# Patient Record
Sex: MALE | Race: WHITE | NOT HISPANIC OR LATINO | Employment: FULL TIME | URBAN - METROPOLITAN AREA
[De-identification: names, ages, dates, MRNs, and addresses within clinical notes are randomized per-mention and may not be internally consistent; named-entity substitution may affect disease eponyms.]

---

## 2018-11-28 ENCOUNTER — APPOINTMENT (OUTPATIENT)
Dept: RADIOLOGY | Facility: CLINIC | Age: 33
End: 2018-11-28
Payer: COMMERCIAL

## 2018-11-28 VITALS
WEIGHT: 236 LBS | HEART RATE: 80 BPM | BODY MASS INDEX: 31.28 KG/M2 | DIASTOLIC BLOOD PRESSURE: 85 MMHG | HEIGHT: 73 IN | SYSTOLIC BLOOD PRESSURE: 137 MMHG

## 2018-11-28 DIAGNOSIS — M77.11 LATERAL EPICONDYLITIS OF RIGHT ELBOW: Primary | ICD-10-CM

## 2018-11-28 DIAGNOSIS — M77.01 MEDIAL EPICONDYLITIS OF RIGHT ELBOW: ICD-10-CM

## 2018-11-28 DIAGNOSIS — M25.521 PAIN IN RIGHT ELBOW: ICD-10-CM

## 2018-11-28 DIAGNOSIS — G56.21 NEURITIS OF RIGHT ULNAR NERVE: ICD-10-CM

## 2018-11-28 PROCEDURE — 99243 OFF/OP CNSLTJ NEW/EST LOW 30: CPT | Performed by: SURGERY

## 2018-11-28 PROCEDURE — 73080 X-RAY EXAM OF ELBOW: CPT

## 2018-11-28 RX ORDER — FEXOFENADINE HCL 180 MG/1
180 TABLET ORAL DAILY
COMMUNITY

## 2018-11-28 NOTE — PROGRESS NOTES
Gunjan LESTER  Attending, Orthopaedic Surgery  Hand, Wrist, and Elbow Surgery  [facility]      ORTHOPAEDIC HAND, WRIST, AND ELBOW OFFICE  VISIT       ASSESSMENT/PLAN:      Right elbow medial epicondylitis, lateral epicondylitis, ulnar nerve neuritis    Diagnostics reviewed and physical exam performed  Diagnosis, treatment options and associated risks were discussed with the patient including no treatment, nonsurgical treatment and potential for surgical intervention  The patient was given the opportunity to ask questions regarding each  Recommend a course of formal therapy to include ulnar nerve gliding as well as use of flexi-bar  Educated on the use of over-the-counter Aleve 2 pills twice a day routinely for the next 10 days and then as indicated  Avoid repetitive work or aggravating activities  The patient verbalized understanding of exam findings and treatment plan  We engaged in the shared decision-making process and treatment options were discussed at length with the patient  Surgical and conservative management discussed today along with risks and benefits  Diagnoses and all orders for this visit:    Lateral epicondylitis of right elbow  -     XR elbow 3+ vw right; Future  -     Ambulatory referral to PT/OT hand therapy; Future    Medial epicondylitis of right elbow  -     Ambulatory referral to PT/OT hand therapy; Future    Neuritis of right ulnar nerve  -     Ambulatory referral to PT/OT hand therapy; Future    Other orders  -     fexofenadine (ALLEGRA) 180 MG tablet; Take 180 mg by mouth daily        Follow Up:  Return for re-check in 6-8 weeks   To Do Next Visit:  Re-evaluation of current issue      General Discussions:    Cubital Tunnel Syndrome: The anatomy and physiology of cubital tunnel syndrome were discussed with the patient today in the office    Typically, increased elbow flexion activities decrease blood flow within the intraneural spaces, resulting in a feeling of numbness, tingling, weakness, or clumsiness within the hand and fingers  Occasionally, anatomic structures such as medial elbow osteophytes, the medial head of the triceps, were subluxing ulnar nerve may result in increased pressure or aggravation at the cubital tunnel  Typical signs and symptoms usually include numbness and tingling within the ring and small finger, weakness with , and weakness with pinch  Conservative treatment and includes nocturnal bracing to keep the elbow in a semi-extended position, activity modification, therapy, and avoiding excessive elbow flexion activities  Vitamin B6 one tablet daily over the counter may helpful to reduce symptoms  A majority of patients typically respond to conservative treatment over a period of approximately 3-6 months  EMG/NCV testing of the ulnar nerve at the elbow is not as reliable as carpal tunnel syndrome  Surgical intervention in the form of in situ release of the ulnar nerve at the elbow or ulnar nerve transposition may be required in up to 20% of patients  Lateral Epicondylitis: The anatomy and physiology of lateral epicondylitis was discussed with the patient today  Typically, degenerative changes in the extensor carpi radialis brevis muscle occur over time  These degenerative changes appear as tears of the tendon on MRI  This creates pain over the lateral epicondyle (outside part of elbow)  This pain typically is made worse with palm down lifting activities as well as anything that involves strength and stability of the wrist   The pain may radiate from the wrist up to the elbow  At times, the shoulder may be weak as well which can predispose or cause continuation of the problem  Conservative treatment usually cures a majority of patients; however, this may take up to 6-9 months    Conservative treatment options typically include activity modification, therapy for strengthening of the shoulder and elbow, tennis elbow straps, and possible corticosteroid injections  Corticosteroid injections are very effective at relieving pain but do not alter the natural history of this process  Rather, steroid injections decrease the pain temporarily to allow for therapy to take place without discomfort  It was discussed that therapy to prevent recurrence is a "life long" process and that if patient relies on the steroid injection alone without performing therapeutic exercises the risk of recurrence is likely  Typical home regimen includes heat and stretching and resisted wrist extension exercises discussed in the office  Surgery is required in fewer than 10% of patients  Medial Epicondylitis: The anatomy and physiology of medial epicondylitis was discussed with the patient today  Typically, degenerative changes in the flexor pronator mass occur over time  These degenerative changes appear as tears of the tendon on MRI  This creates pain over the medial epicondyle  This pain typically is made worse with palm up lifting activities as well as anything that involves strength and stability of the wrist   The pain may radiate from the wrist up to the elbow  At times, the shoulder may be weak as well which can predispose or cause continuation of the problem  Conservative treatment usually cures a vast majority of patients; however, this may take up to 6-9 months  Conservative treatment options typically include activity modification, therapy for strengthening of the shoulder and elbow, tennis elbow strap, and possible corticosteroid injections  Corticosteroid injections are very effective at relieving pain but do not alter the natural history of this process  Rather, steroid injections decrease the pain temporarily to allow for therapy to take place without discomfort   It was discussed that therapy to prevent recurrence is a "life long" process and that if patient relies on the steroid injection alone without performing therapeutic exercises the risk of recurrence is likely  Typical home regimen includes heat,  stretching and resisted wrist flex ion and forearm rotation exercises discussed in the office  Surgery is required in fewer than 5% of patients  At times, the ulnar nerve may be aggravated with this condition  Operative Discussions:  None indicated      ____________________________________________________________________________________________________________________________________________      CHIEF COMPLAINT:  Chief Complaint   Patient presents with    Right Elbow - Pain    Right Forearm - Pain       SUBJECTIVE:  Tal Tate is a 35y o  year old RHD male who presents initial evaluation of right elbow and forearm discomfort  His symptoms are greater medial than lateral   He states that his symptoms started in August without any precipitating injury  He denies any numbness or tingling  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  He has an occasional golfer which does not aggravate his symptoms  He was routinely visiting a fitness facility up until July without any issues however was doing heavy weights at the time  He has not had any formal treatment  Of note several years ago he had a right shoulder labral pathology in which he was in formal physical therapy for  Pain/symptom timing:  Worse during the day when active  Pain/symptom context:  Worse with activites and work  Pain/symptom modifying factors:  Rest makes better, activities make worse  Pain/symptom associated signs/symptoms: none    Prior treatment   · NSAIDsNo   · Injections No   · Bracing/Orthotics No    Physical Therapy No     I have personally reviewed all the relevant PMH, PSH, SH, FH, Medications and allergies      PAST MEDICAL HISTORY:  History reviewed  No pertinent past medical history  PAST SURGICAL HISTORY:  History reviewed  No pertinent surgical history      FAMILY HISTORY:  History reviewed  No pertinent family history  SOCIAL HISTORY:  Social History   Substance Use Topics    Smoking status: Never Smoker    Smokeless tobacco: Never Used    Alcohol use No       MEDICATIONS:    Current Outpatient Prescriptions:     fexofenadine (ALLEGRA) 180 MG tablet, Take 180 mg by mouth daily, Disp: , Rfl:     ALLERGIES:  No Known Allergies        REVIEW OF SYSTEMS:  Review of systems negative unless otherwise specified in HPI  Review of Systems   Constitutional: Negative for activity change, chills, fever and unexpected weight change  HENT: Negative for trouble swallowing and voice change  Eyes: Negative for pain and visual disturbance  Respiratory: Negative for shortness of breath and wheezing  Cardiovascular: Negative for chest pain, palpitations and leg swelling  Gastrointestinal: Negative for abdominal pain, nausea and vomiting  Endocrine: Negative for polydipsia and polyuria  Genitourinary: Negative for dysuria and hematuria  Musculoskeletal: Positive for arthralgias  Negative for joint swelling and myalgias  Skin: Negative for rash and wound  Neurological: Negative for dizziness, numbness and headaches  Psychiatric/Behavioral: Negative for decreased concentration and suicidal ideas  The patient is not nervous/anxious          VITALS:  Vitals:    11/28/18 0803   BP: 137/85   Pulse: 80       LABS:  HgA1c: No results found for: HGBA1C  BMP: No results found for: GLUCOSE, CALCIUM, NA, K, CO2, CL, BUN, CREATININE    _____________________________________________________  PHYSICAL EXAMINATION:  General: well developed and well nourished, alert, oriented times 3 and appears comfortable  Psychiatric: Normal  HEENT: Normocephalic, Atraumatic Trachea Midline, No torticollis  Pulmonary: No audible wheezing or respiratory distress   Cardiovascular: No pitting edema, 2+ radial pulse   Skin: No masses, erthema, lacerations, fluctation, ulcerations  Neurovascular: Sensation Intact to the Median, Ulnar, Radial Nerve, Motor Intact to the Median, Ulnar, Radial Nerve and Pulses Intact  Musculoskeletal: Normal, except as noted in detailed exam and in HPI  MUSCULOSKELETAL EXAMINATION:    Right Elbow:  There is no swelling present  There is no ecchymosis noted  The ROM is full in all planes of motion, no ulnar nerve subluxation  + tinels at the elbow, slightly + ulnar nerve compression  Increased forearm pain with resistance to wrist extension, not with resistance to forearm supination or pronation or wrist flexion  Slight tenderness radial tunnel  Tenderness over medial and lateral epicondyles  There is no instability with varus or valgus stress         ___________________________________________________  STUDIES REVIEWED:  The attending physician has personally reviewed the pertinent films in PACS and interpretation is as follows:   AP, lateral, and oblique radiographs   Right elbow x-rays taken and reviewed in the office today show:  No fracture dislocation otherwise unremarkable        PROCEDURES PERFORMED:  Procedures  No Procedures performed today    _____________________________________________________      Catrina Ryan    I,:   Jodie Longoria am acting as a scribe while in the presence of the attending physician :        I,:   Austin Pillai MD personally performed the services described in this documentation    as scribed in my presence :

## 2018-11-28 NOTE — LETTER
November 28, 2018     MICAH Phillip DO  [address] [place] [address]    Patient: Karen Edmonds   YOB: 1985   Date of Visit: 11/28/2018       Dear Dr Shirley Chaney: Thank you for referring Karen Edmonds to me for evaluation  Below are my notes for this consultation  If you have questions, please do not hesitate to call me  I look forward to following your patient along with you  Sincerely,        Jaylen Patrick MD        CC: No Recipients    Jaylen LESTER  Attending, Orthopaedic Surgery  Hand, Wrist, and Elbow Surgery  [facility]      ORTHOPAEDIC HAND, WRIST, AND ELBOW OFFICE  VISIT       ASSESSMENT/PLAN:      Right elbow medial epicondylitis, lateral epicondylitis, ulnar nerve neuritis    Diagnostics reviewed and physical exam performed  Diagnosis, treatment options and associated risks were discussed with the patient including no treatment, nonsurgical treatment and potential for surgical intervention  The patient was given the opportunity to ask questions regarding each  Recommend a course of formal therapy to include ulnar nerve gliding as well as use of flexi-bar  Educated on the use of over-the-counter Aleve 2 pills twice a day routinely for the next 10 days and then as indicated  Avoid repetitive work or aggravating activities  The patient verbalized understanding of exam findings and treatment plan  We engaged in the shared decision-making process and treatment options were discussed at length with the patient  Surgical and conservative management discussed today along with risks and benefits  Diagnoses and all orders for this visit:    Lateral epicondylitis of right elbow  -     XR elbow 3+ vw right; Future  -     Ambulatory referral to PT/OT hand therapy; Future    Medial epicondylitis of right elbow  -     Ambulatory referral to PT/OT hand therapy;  Future    Neuritis of right ulnar nerve  -     Ambulatory referral to PT/OT hand therapy; Future    Other orders  -     fexofenadine (ALLEGRA) 180 MG tablet; Take 180 mg by mouth daily        Follow Up:  Return for re-check in 6-8 weeks   To Do Next Visit:  Re-evaluation of current issue      General Discussions:    Cubital Tunnel Syndrome: The anatomy and physiology of cubital tunnel syndrome were discussed with the patient today in the office  Typically, increased elbow flexion activities decrease blood flow within the intraneural spaces, resulting in a feeling of numbness, tingling, weakness, or clumsiness within the hand and fingers  Occasionally, anatomic structures such as medial elbow osteophytes, the medial head of the triceps, were subluxing ulnar nerve may result in increased pressure or aggravation at the cubital tunnel  Typical signs and symptoms usually include numbness and tingling within the ring and small finger, weakness with , and weakness with pinch  Conservative treatment and includes nocturnal bracing to keep the elbow in a semi-extended position, activity modification, therapy, and avoiding excessive elbow flexion activities  Vitamin B6 one tablet daily over the counter may helpful to reduce symptoms  A majority of patients typically respond to conservative treatment over a period of approximately 3-6 months  EMG/NCV testing of the ulnar nerve at the elbow is not as reliable as carpal tunnel syndrome  Surgical intervention in the form of in situ release of the ulnar nerve at the elbow or ulnar nerve transposition may be required in up to 20% of patients  Lateral Epicondylitis: The anatomy and physiology of lateral epicondylitis was discussed with the patient today  Typically, degenerative changes in the extensor carpi radialis brevis muscle occur over time  These degenerative changes appear as tears of the tendon on MRI  This creates pain over the lateral epicondyle (outside part of elbow)    This pain typically is made worse with palm down lifting activities as well as anything that involves strength and stability of the wrist   The pain may radiate from the wrist up to the elbow  At times, the shoulder may be weak as well which can predispose or cause continuation of the problem  Conservative treatment usually cures a majority of patients; however, this may take up to 6-9 months  Conservative treatment options typically include activity modification, therapy for strengthening of the shoulder and elbow, tennis elbow straps, and possible corticosteroid injections  Corticosteroid injections are very effective at relieving pain but do not alter the natural history of this process  Rather, steroid injections decrease the pain temporarily to allow for therapy to take place without discomfort  It was discussed that therapy to prevent recurrence is a "life long" process and that if patient relies on the steroid injection alone without performing therapeutic exercises the risk of recurrence is likely  Typical home regimen includes heat and stretching and resisted wrist extension exercises discussed in the office  Surgery is required in fewer than 10% of patients  Medial Epicondylitis: The anatomy and physiology of medial epicondylitis was discussed with the patient today  Typically, degenerative changes in the flexor pronator mass occur over time  These degenerative changes appear as tears of the tendon on MRI  This creates pain over the medial epicondyle  This pain typically is made worse with palm up lifting activities as well as anything that involves strength and stability of the wrist   The pain may radiate from the wrist up to the elbow  At times, the shoulder may be weak as well which can predispose or cause continuation of the problem  Conservative treatment usually cures a vast majority of patients; however, this may take up to 6-9 months    Conservative treatment options typically include activity modification, therapy for strengthening of the shoulder and elbow, tennis elbow strap, and possible corticosteroid injections  Corticosteroid injections are very effective at relieving pain but do not alter the natural history of this process  Rather, steroid injections decrease the pain temporarily to allow for therapy to take place without discomfort  It was discussed that therapy to prevent recurrence is a "life long" process and that if patient relies on the steroid injection alone without performing therapeutic exercises the risk of recurrence is likely  Typical home regimen includes heat,  stretching and resisted wrist flex ion and forearm rotation exercises discussed in the office  Surgery is required in fewer than 5% of patients  At times, the ulnar nerve may be aggravated with this condition  Operative Discussions:  None indicated      ____________________________________________________________________________________________________________________________________________      CHIEF COMPLAINT:  Chief Complaint   Patient presents with    Right Elbow - Pain    Right Forearm - Pain       SUBJECTIVE:  Bubba Golden is a 35y o  year old RHD male who presents initial evaluation of right elbow and forearm discomfort  His symptoms are greater medial than lateral   He states that his symptoms started in August without any precipitating injury  He denies any numbness or tingling  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  He has an occasional golfer which does not aggravate his symptoms  He was routinely visiting a fitness facility up until July without any issues however was doing heavy weights at the time  He has not had any formal treatment  Of note several years ago he had a right shoulder labral pathology in which he was in formal physical therapy for        Pain/symptom timing:  Worse during the day when active  Pain/symptom context:  Worse with activites and work  Pain/symptom modifying factors:  Rest makes better, activities make worse  Pain/symptom associated signs/symptoms: none    Prior treatment   · NSAIDsNo   · Injections No   · Bracing/Orthotics No    Physical Therapy No     I have personally reviewed all the relevant PMH, PSH, SH, FH, Medications and allergies      PAST MEDICAL HISTORY:  History reviewed  No pertinent past medical history  PAST SURGICAL HISTORY:  History reviewed  No pertinent surgical history  FAMILY HISTORY:  History reviewed  No pertinent family history  SOCIAL HISTORY:  Social History   Substance Use Topics    Smoking status: Never Smoker    Smokeless tobacco: Never Used    Alcohol use No       MEDICATIONS:    Current Outpatient Prescriptions:     fexofenadine (ALLEGRA) 180 MG tablet, Take 180 mg by mouth daily, Disp: , Rfl:     ALLERGIES:  No Known Allergies        REVIEW OF SYSTEMS:  Review of systems negative unless otherwise specified in HPI  Review of Systems   Constitutional: Negative for activity change, chills, fever and unexpected weight change  HENT: Negative for trouble swallowing and voice change  Eyes: Negative for pain and visual disturbance  Respiratory: Negative for shortness of breath and wheezing  Cardiovascular: Negative for chest pain, palpitations and leg swelling  Gastrointestinal: Negative for abdominal pain, nausea and vomiting  Endocrine: Negative for polydipsia and polyuria  Genitourinary: Negative for dysuria and hematuria  Musculoskeletal: Positive for arthralgias  Negative for joint swelling and myalgias  Skin: Negative for rash and wound  Neurological: Negative for dizziness, numbness and headaches  Psychiatric/Behavioral: Negative for decreased concentration and suicidal ideas  The patient is not nervous/anxious          VITALS:  Vitals:    11/28/18 0803   BP: 137/85   Pulse: 80       LABS:  HgA1c: No results found for: HGBA1C  BMP: No results found for: GLUCOSE, CALCIUM, NA, K, CO2, CL, BUN, CREATININE    _____________________________________________________  PHYSICAL EXAMINATION:  General: well developed and well nourished, alert, oriented times 3 and appears comfortable  Psychiatric: Normal  HEENT: Normocephalic, Atraumatic Trachea Midline, No torticollis  Pulmonary: No audible wheezing or respiratory distress   Cardiovascular: No pitting edema, 2+ radial pulse   Skin: No masses, erthema, lacerations, fluctation, ulcerations  Neurovascular: Sensation Intact to the Median, Ulnar, Radial Nerve, Motor Intact to the Median, Ulnar, Radial Nerve and Pulses Intact  Musculoskeletal: Normal, except as noted in detailed exam and in HPI  MUSCULOSKELETAL EXAMINATION:    Right Elbow:  There is no swelling present  There is no ecchymosis noted  The ROM is full in all planes of motion, no ulnar nerve subluxation  + tinels at the elbow, slightly + ulnar nerve compression  Increased forearm pain with resistance to wrist extension, not with resistance to forearm supination or pronation or wrist flexion  Slight tenderness radial tunnel  Tenderness over medial and lateral epicondyles  There is no instability with varus or valgus stress         ___________________________________________________  STUDIES REVIEWED:  The attending physician has personally reviewed the pertinent films in PACS and interpretation is as follows:   AP, lateral, and oblique radiographs   Right elbow x-rays taken and reviewed in the office today show:  No fracture dislocation otherwise unremarkable        PROCEDURES PERFORMED:  Procedures  No Procedures performed today    _____________________________________________________      Indu Nick    I,:   Luba Crjai am acting as a scribe while in the presence of the attending physician :        I,:   Daiana Banks MD personally performed the services described in this documentation    as scribed in my presence : Attestation signed by Mara York MD at 11/28/2018  9:43 AM:  I saw and examined the patient personally and agree with my physician extender's note and plan   I formulated  the plan and gave  explicit instructions to the patient

## 2018-12-17 ENCOUNTER — EVALUATION (OUTPATIENT)
Dept: OCCUPATIONAL THERAPY | Facility: CLINIC | Age: 33
End: 2018-12-17
Payer: COMMERCIAL

## 2018-12-17 DIAGNOSIS — M77.01 MEDIAL EPICONDYLITIS OF RIGHT ELBOW: ICD-10-CM

## 2018-12-17 DIAGNOSIS — M77.11 LATERAL EPICONDYLITIS OF RIGHT ELBOW: ICD-10-CM

## 2018-12-17 DIAGNOSIS — G56.21 NEURITIS OF RIGHT ULNAR NERVE: ICD-10-CM

## 2018-12-17 PROCEDURE — 97165 OT EVAL LOW COMPLEX 30 MIN: CPT | Performed by: OCCUPATIONAL THERAPIST

## 2018-12-17 PROCEDURE — G8991 OTHER PT/OT GOAL STATUS: HCPCS | Performed by: OCCUPATIONAL THERAPIST

## 2018-12-17 PROCEDURE — 97140 MANUAL THERAPY 1/> REGIONS: CPT | Performed by: OCCUPATIONAL THERAPIST

## 2018-12-17 PROCEDURE — G8990 OTHER PT/OT CURRENT STATUS: HCPCS | Performed by: OCCUPATIONAL THERAPIST

## 2018-12-17 NOTE — PROGRESS NOTES
OT Evaluation     Today's date: 2018  Patient name: Erica Lee  : 1985  MRN: [id number]  Referring provider: Jessica Lopez MD  Dx:   Encounter Diagnosis     ICD-10-CM    1  Lateral epicondylitis of right elbow M77 11 Ambulatory referral to PT/OT hand therapy   2  Medial epicondylitis of right elbow M77 01 Ambulatory referral to PT/OT hand therapy   3  Neuritis of right ulnar nerve G56 21 Ambulatory referral to PT/OT hand therapy                  Assessment  Assessment details: Referred with the formal diagnosis of R medial epicondylitis, R cubital tunnel syndrome, and minor R lateral epicondylitis  He is relying on a counter force strap and this has improved his symptoms greatly  His medial epicondyle tenderness persists, and his pain is present with any gripping or reaching activity  Lateral pain is minimal at this time  See below for a detailed assessment  Impairments: abnormal or restricted ROM, activity intolerance, impaired physical strength and pain with function    Symptom irritability: lowUnderstanding of Dx/Px/POC: excellent  Goals  STG: Patient will be compliant with ergonomic recommendations and home exercise program in 2 weeks  STG: Pain will be reduced by 25% in 4 weeks  STG: Strength will be improved by 25% in 4 weeks  LTG: Pain will be reduced by 50% in 6 weeks  LTG: Strength will be improved by 50% in 6-8 weeks  LTG: Performance in ADLs and IADLS will be improved to prior level of function with the affected extremity within 6 weeks  LTG: Performance in work  activity will be improved to prior level of function with the affected extremity within 6 weeks  LTG: FOTO score increase by 10 points within 6 weeks  Plan  Plan details: Treatment to include modalities, manual therapy, PRE's, HEP, and orthotics as appropriate     Patient would benefit from: skilled OT, OT eval and custom splinting  Planned modality interventions: thermotherapy: hydrocollator packs  Planned therapy interventions: functional ROM exercises, home exercise program, joint mobilization, manual therapy, therapeutic exercise, massage, [place] taping, body mechanics training, behavior modification and orthotic management and training  Frequency: 2x week  Duration in weeks: 6  Treatment plan discussed with: patient        Subjective Evaluation    History of Present Illness  Mechanism of injury: Referred for RUE pain  States symptoms that began over the summer and the medial elbow is more painful than the lateral elbow  Pain  Current pain ratin  At best pain ratin  At worst pain ratin (On medial elbow)    Social Support    Employment status: working ( at [address] [place], [place] [address]micky Davis )  Hand dominance: right    Treatments  Current treatment: occupational therapy  Patient Goals  Patient goals for therapy: increased strength, decreased pain, increased motion, independence with ADLs/IADLs and return to sport/leisure activities          Objective     Tenderness     Right Elbow   Tenderness in the cubital tunnel, lateral epicondyle and medial epicondyle  Right Wrist/Hand   Tenderness in the common extensor tendon, lateral epicondyle and medial epicondyle  Additional Tenderness Details  +radial tunnel     Active Range of Motion     Right Elbow   Normal active range of motion    Right Wrist   Normal active range of motion    Strength/Myotome Testing     Left Wrist/Hand      (2nd hand position)     Trial 1: 105    Right Wrist/Hand      (2nd hand position)     Trial 1: 50    Comments: +painful stress position gripping, 5/10 pain     Tests     Right Elbow   Positive Tinel's sign (cubital tunnel)  Negative active medial epicondylitis, Cozen's, elbow flexion, Mill's and passive medial epicondylitis       Additional Tests Details  +3rd finger extension test       Flowsheet Rows      Most Recent Value   PT/OT G-Codes   Current Score  63   Projected Score  75 Assessment Type  Evaluation   G code set  Other PT/OT Primary   Other PT Primary Current Status ()  CJ   Other PT Primary Goal Status ()  Kip Gould

## 2018-12-17 NOTE — PROGRESS NOTES
Daily Note     Today's date: 2018  Patient name: Bubba Golden  : 1985  MRN: [id number]  Referring provider: Vinnie Livingston MD  Dx:   Encounter Diagnosis     ICD-10-CM    1  Lateral epicondylitis of right elbow M77 11 Ambulatory referral to PT/OT hand therapy     OT Plan of Care Cert/Re-cert   2  Medial epicondylitis of right elbow M77 01 Ambulatory referral to PT/OT hand therapy     OT Plan of Care Cert/Re-cert   3  Neuritis of right ulnar nerve G56 21 Ambulatory referral to PT/OT hand therapy     OT Plan of Care Cert/Re-cert                  Subjective: See eval        Objective: See treatment diary below and attached eval        Assessment: Tolerated treatment well  Patient would benefit from continued OT      Plan: Continue per plan of care  Focus on medial epicondyle irritation with lateral epicondyle PRN         Specialty Daily Treatment Diary      Manual              IASTM medial epicondyle and flexors  10'           IASTM lateral epicondyle and extensors 5'           KT PRN             Extrinsic Stretching              JIM                    Exercise Diary              Eccentric WF             Eccentric pronation             Wrist Maze             MWM                                                                                                                                                                                                                               HEP: Isometrics, CFM, Mill's stretches  Issued                 Modalities             MHP Pre  10           CP post PRN

## 2018-12-19 ENCOUNTER — OFFICE VISIT (OUTPATIENT)
Dept: OCCUPATIONAL THERAPY | Facility: CLINIC | Age: 33
End: 2018-12-19
Payer: COMMERCIAL

## 2018-12-19 DIAGNOSIS — M77.01 MEDIAL EPICONDYLITIS OF RIGHT ELBOW: ICD-10-CM

## 2018-12-19 DIAGNOSIS — M77.11 LATERAL EPICONDYLITIS OF RIGHT ELBOW: Primary | ICD-10-CM

## 2018-12-19 DIAGNOSIS — G56.21 NEURITIS OF RIGHT ULNAR NERVE: ICD-10-CM

## 2018-12-19 PROCEDURE — 97140 MANUAL THERAPY 1/> REGIONS: CPT | Performed by: OCCUPATIONAL THERAPIST

## 2018-12-19 PROCEDURE — 97110 THERAPEUTIC EXERCISES: CPT | Performed by: OCCUPATIONAL THERAPIST

## 2018-12-19 NOTE — PROGRESS NOTES
Daily Note     Today's date: 2018  Patient name: Julianne Green  : 1985  MRN: [id number]  Referring provider: Priya Kumar MD  Dx:   Encounter Diagnosis     ICD-10-CM    1  Lateral epicondylitis of right elbow M77 11    2  Medial epicondylitis of right elbow M77 01    3  Neuritis of right ulnar nerve G56 21                   Subjective: States some wrist extensor pain during PREs  Objective: See treatment diary below and attached eval        Assessment: Tolerated treatment well  Patient would benefit from continued OT  Some soreness reported from last session IASTM  Did well tolerating the manuals today and the PREs  Can upgrade next session and would benefit from kinetic chain strengthening  Plan: Continue per plan of care  Focus on medial epicondyle irritation with lateral epicondyle PRN         Specialty Daily Treatment Diary      Manual            IASTM medial epicondyle and flexors  10'  15'         IASTM lateral epicondyle and extensors 5'           KT PRN             Extrinsic Stretching     Self         JIM              Cupping  5'                    Exercise Diary            Eccentric WF    2# 3x10         Eccentric pronation    1# 2x10         Green flex bar pro/sup bending   2x10         MWM              Green flex bar flex/extend   2x10                                                                                                                                                                                                             HEP: Isometrics, CFM, Mill's stretches  Issued                 Modalities           MHP Pre  10  10'         CP post PRN

## 2018-12-26 ENCOUNTER — OFFICE VISIT (OUTPATIENT)
Dept: OCCUPATIONAL THERAPY | Facility: CLINIC | Age: 33
End: 2018-12-26
Payer: COMMERCIAL

## 2018-12-26 DIAGNOSIS — M77.01 MEDIAL EPICONDYLITIS OF RIGHT ELBOW: ICD-10-CM

## 2018-12-26 DIAGNOSIS — M77.11 LATERAL EPICONDYLITIS OF RIGHT ELBOW: Primary | ICD-10-CM

## 2018-12-26 DIAGNOSIS — G56.21 NEURITIS OF RIGHT ULNAR NERVE: ICD-10-CM

## 2018-12-26 PROCEDURE — 97140 MANUAL THERAPY 1/> REGIONS: CPT | Performed by: OCCUPATIONAL THERAPIST

## 2018-12-26 PROCEDURE — 97110 THERAPEUTIC EXERCISES: CPT | Performed by: OCCUPATIONAL THERAPIST

## 2018-12-26 NOTE — PROGRESS NOTES
Daily Note     Today's date: 2018  Patient name: Bubba Golden  : 1985  MRN: [id number]  Referring provider: Vinnie Livingston MD  Dx:   Encounter Diagnosis     ICD-10-CM    1  Lateral epicondylitis of right elbow M77 11    2  Medial epicondylitis of right elbow M77 01    3  Neuritis of right ulnar nerve G56 21                   Subjective: States that the pain is improved       Objective: See treatment diary below  Assessment: Tolerated treatment well  Patient would benefit from continued OT  Less trigger point in the flexors  States improved tolerance towards gripping  Plan: Continue per plan of care  Focus on medial epicondyle irritation with lateral epicondyle PRN         Specialty Daily Treatment Diary      Manual          IASTM medial epicondyle and flexors  10'  15'  12'       IASTM lateral epicondyle and extensors 5'    3'       KT PRN             Extrinsic Stretching    Hai Cat       JIM              Cupping  5' 2'                   Exercise Diary          Eccentric WF    2# 3x10  3# 3x10       Eccentric pronation    1# 2x10  2# 2x10        Green flex bar pro/sup bending   2x10         MWM              Green flex bar flex/extend   2x10          GTB wall walking      2x10        Yellow ext web      3x10                                                                                                                                                                               HEP: Isometrics, CFM, Mill's stretches  Issued                 Modalities         MHP Pre  10  10'  10'       CP post PRN

## 2018-12-28 ENCOUNTER — OFFICE VISIT (OUTPATIENT)
Dept: OCCUPATIONAL THERAPY | Facility: CLINIC | Age: 33
End: 2018-12-28
Payer: COMMERCIAL

## 2018-12-28 DIAGNOSIS — M77.11 LATERAL EPICONDYLITIS OF RIGHT ELBOW: Primary | ICD-10-CM

## 2018-12-28 DIAGNOSIS — M77.01 MEDIAL EPICONDYLITIS OF RIGHT ELBOW: ICD-10-CM

## 2018-12-28 DIAGNOSIS — G56.21 NEURITIS OF RIGHT ULNAR NERVE: ICD-10-CM

## 2018-12-28 PROCEDURE — 97140 MANUAL THERAPY 1/> REGIONS: CPT | Performed by: OCCUPATIONAL THERAPIST

## 2018-12-28 PROCEDURE — 97110 THERAPEUTIC EXERCISES: CPT | Performed by: OCCUPATIONAL THERAPIST

## 2018-12-28 NOTE — PROGRESS NOTES
Daily Note     Today's date: 2018  Patient name: Mitchell Hdz  : 1985  MRN: [id number]  Referring provider: Gerri Escobedo MD  Dx:   Encounter Diagnosis     ICD-10-CM    1  Lateral epicondylitis of right elbow M77 11    2  Medial epicondylitis of right elbow M77 01    3  Neuritis of right ulnar nerve G56 21                   Subjective: States that the pain is improved       Objective: See treatment diary below  Assessment: Tolerated treatment well  Patient would benefit from continued OT  Some increased pain on the lateral side of the elbow, however no pain with resisted wrist extension  Plan: Continue per plan of care  Focus on medial epicondyle irritation with lateral epicondyle PRN         Specialty Daily Treatment Diary      Manual        IASTM medial epicondyle and flexors  10'  15'  12'  10'     IASTM lateral epicondyle and extensors 5'    3'  5'     KT PRN             Extrinsic Stretching    Mark Saucedo       JIM              Cupping  5' 2'                   Exercise Diary        Eccentric WF    2# 3x10  3# 3x10  3# 3x10      Eccentric pronation    1# 2x10  2# 2x10        Green flex bar pro/sup bending   2x10    2x10     MWM              Green flex bar flex/extend   2x10    2x10      GTB wall walking     2x10  2x10      Yellow ext web      3x10  3x10      UBE       3'2 6 0 resistance      Blue theraband rows        3x10                                                                                                                                                 HEP: Isometrics, CFM, Mill's stretches  Issued                 Modalities       MHP Pre  10  10'  10'  10'     CP post PRN

## 2018-12-31 ENCOUNTER — OFFICE VISIT (OUTPATIENT)
Dept: OCCUPATIONAL THERAPY | Facility: CLINIC | Age: 33
End: 2018-12-31
Payer: COMMERCIAL

## 2018-12-31 DIAGNOSIS — M77.11 LATERAL EPICONDYLITIS OF RIGHT ELBOW: Primary | ICD-10-CM

## 2018-12-31 PROCEDURE — 97530 THERAPEUTIC ACTIVITIES: CPT | Performed by: OCCUPATIONAL THERAPIST

## 2018-12-31 PROCEDURE — 97110 THERAPEUTIC EXERCISES: CPT | Performed by: OCCUPATIONAL THERAPIST

## 2018-12-31 PROCEDURE — 97140 MANUAL THERAPY 1/> REGIONS: CPT | Performed by: OCCUPATIONAL THERAPIST

## 2018-12-31 NOTE — PROGRESS NOTES
Daily Note     Today's date: 2018  Patient name: Frederick Galo  : 1985  MRN: [id number]  Referring provider: Fabiano Salas MD  Dx:   Encounter Diagnosis     ICD-10-CM    1  Lateral epicondylitis of right elbow M77 11                   Subjective: "I can  a lot better  Now"      Objective: See treatment diary below  Assessment: Tolerated treatment well  Patient would benefit from continued OT  Reports improved use but   Educated on not resting elbow on cubital tunnel with activities  Expanded HEP for shoulder dysfunction  Plan: Continue per plan of care  Focus on medial epicondyle irritation with lateral epicondyle PRN         Specialty Daily Treatment Diary      Manual      IASTM medial epicondyle and flexors  10'  15'  12'  10'  10   IASTM lateral epicondyle and extensors 5'    3'  5'  5   KT PRN             Extrinsic Stretching    Georgette Francis       JIM           2   Cupping  5' 2'                   Exercise Diary      Eccentric WF    2# 3x10  3# 3x10  3# 3x10   4# 2x 10   Eccentric pronation    1# 2x10  2# 2x10     hammer 2x10   Green flex bar pro/sup bending   2x10    2x10  2x10   MWM              Green flex bar flex/extend   2x10    2x10  2x10    GTB wall walking     2x10  2x10  2x10    Yellow ext web      3x10  3x10  3x10    UBE       3'2 6 0 resistance      Blue theraband rows        3x10  3x10    Int/ext rot         Red 3x10 Added to HEP                                                                                                                                 HEP: Isometrics, CFM, Mill's stretches  Issued                 Modalities       MHP Pre  10  10'  10'  10'     CP post PRN

## 2019-01-03 ENCOUNTER — OFFICE VISIT (OUTPATIENT)
Dept: OCCUPATIONAL THERAPY | Facility: CLINIC | Age: 34
End: 2019-01-03
Payer: COMMERCIAL

## 2019-01-03 DIAGNOSIS — G56.21 NEURITIS OF RIGHT ULNAR NERVE: ICD-10-CM

## 2019-01-03 DIAGNOSIS — M77.11 LATERAL EPICONDYLITIS OF RIGHT ELBOW: Primary | ICD-10-CM

## 2019-01-03 DIAGNOSIS — M77.01 MEDIAL EPICONDYLITIS OF RIGHT ELBOW: ICD-10-CM

## 2019-01-03 PROCEDURE — 97140 MANUAL THERAPY 1/> REGIONS: CPT | Performed by: OCCUPATIONAL THERAPIST

## 2019-01-03 PROCEDURE — 97110 THERAPEUTIC EXERCISES: CPT | Performed by: OCCUPATIONAL THERAPIST

## 2019-01-03 NOTE — PROGRESS NOTES
Daily Note     Today's date: 1/3/2019  Patient name: Cierra Biswsa  : 1985  MRN: [id number]  Referring provider: Nick Rogers MD  Dx:   Encounter Diagnosis     ICD-10-CM    1  Lateral epicondylitis of right elbow M77 11    2  Medial epicondylitis of right elbow M77 01    3  Neuritis of right ulnar nerve G56 21                   Subjective: reports numbness in the small and ring fingers during at home nerve gliding       Objective: See treatment diary below  Assessment: Tolerated treatment well  Patient would benefit from continued OT  Plan: Continue per plan of care  Focus on medial epicondyle irritation with lateral epicondyle PRN         Specialty Daily Treatment Diary      Manual   1/3  12/19  12/26  12/28  12/31   IASTM medial epicondyle and flexors  5'  15'  12'  10'  10   IASTM lateral epicondyle and extensors 5'    3'  5'  5   KT PRN             Extrinsic Stretching    Filement Stair'        2   Cupping  5' 2'                   Exercise Diary   1/3  12/19  12/26  12/28  12/31   Eccentric WF    2# 3x10  3# 3x10  3# 3x10   4# 2x 10   Eccentric pronation    1# 2x10  2# 2x10     hammer 2x10   Green flex bar pro/sup bending  2x10 2x10    2x10  2x10   MWM              Green flex bar flex/extend  2x10 2x10    2x10  2x10    GTB wall walking     2x10  2x10  2x10    Yellow ext web  310     3x10  3x10  3x10    UBE       3'2 6 0 resistance      Blue theraband rows  3x10       3x10  3x10    Int/ext rot         Red 3x10 Added to HEP    Concentric strength 3# 3x10                                                                                                                            HEP: Isometrics, CFM, Mill's stretches  Issued                 Modalities  12/17  12/19  12/26  12/28  1/3   MHP Pre  10  10'  10'  10'  10'   CP post PRN

## 2019-01-08 ENCOUNTER — OFFICE VISIT (OUTPATIENT)
Dept: OCCUPATIONAL THERAPY | Facility: CLINIC | Age: 34
End: 2019-01-08
Payer: COMMERCIAL

## 2019-01-08 DIAGNOSIS — G56.21 NEURITIS OF RIGHT ULNAR NERVE: ICD-10-CM

## 2019-01-08 DIAGNOSIS — M77.11 LATERAL EPICONDYLITIS OF RIGHT ELBOW: Primary | ICD-10-CM

## 2019-01-08 DIAGNOSIS — M77.01 MEDIAL EPICONDYLITIS OF RIGHT ELBOW: ICD-10-CM

## 2019-01-08 PROCEDURE — 97110 THERAPEUTIC EXERCISES: CPT | Performed by: OCCUPATIONAL THERAPIST

## 2019-01-08 PROCEDURE — 97140 MANUAL THERAPY 1/> REGIONS: CPT | Performed by: OCCUPATIONAL THERAPIST

## 2019-01-08 NOTE — PROGRESS NOTES
Daily Note     Today's date: 2019  Patient name: Dana Gomes  : 1985  MRN: [id number]  Referring provider: Daniela Herman MD  Dx:   Encounter Diagnosis     ICD-10-CM    1  Lateral epicondylitis of right elbow M77 11    2  Medial epicondylitis of right elbow M77 01    3  Neuritis of right ulnar nerve G56 21                   Subjective: "it is just the nerve that hurts"      Objective: See treatment diary below  Assessment: Tolerated treatment well  Patient would benefit from continued OT  Complaints are now related to the ulnar nerve almost exclusively  He states that this is most bothersome at nighttime  Fabricated nighttime elbow extension splint today  Also applied KT for the ulnar nerve  Plan: Continue per plan of care  Focus on medial epicondyle irritation with lateral epicondyle PRN         Specialty Daily Treatment Diary      Manual   1/3  1/8  12/26  12/28  12/31   IASTM medial epicondyle and flexors  5'    12'  10'  10   IASTM lateral epicondyle and extensors 5'    3'  5'  5   KT PRN             Extrinsic Stretching      Self       Gaynelle Barton'  10'      2   Cupping   2'                   Exercise Diary   1/3  1/8  12/26  12/28  12/31   Eccentric WF      3# 3x10  3# 3x10   4# 2x 10   Eccentric pronation      2# 2x10     hammer 2x10   Green flex bar pro/sup bending  2x10 2x10    2x10  2x10   MWM              Green flex bar flex/extend  2x10 2x10    2x10  2x10    GTB wall walking     2x10  2x10  2x10    Yellow ext web  310   3x10  3x10  3x10  3x10    UBE       3'2 6 0 resistance      Blue theraband rows  3x10   3x10    3x10  3x10    Int/ext rot         Red 3x10 Added to HEP    Concentric strength 3# 3x10   4# 3x10                                                                                                                         HEP: Isometrics, CFM, Mill's stretches  Issued                 Modalities  1/8  12/19  12/26  12/28  1/3   MHP Pre  10  10'  10'  10'  10'   CP post PRN

## 2019-01-15 ENCOUNTER — OFFICE VISIT (OUTPATIENT)
Dept: OCCUPATIONAL THERAPY | Facility: CLINIC | Age: 34
End: 2019-01-15
Payer: COMMERCIAL

## 2019-01-15 DIAGNOSIS — M77.01 MEDIAL EPICONDYLITIS OF RIGHT ELBOW: ICD-10-CM

## 2019-01-15 DIAGNOSIS — M77.11 LATERAL EPICONDYLITIS OF RIGHT ELBOW: Primary | ICD-10-CM

## 2019-01-15 DIAGNOSIS — G56.21 NEURITIS OF RIGHT ULNAR NERVE: ICD-10-CM

## 2019-01-15 PROCEDURE — 97110 THERAPEUTIC EXERCISES: CPT | Performed by: OCCUPATIONAL THERAPIST

## 2019-01-15 PROCEDURE — 97140 MANUAL THERAPY 1/> REGIONS: CPT | Performed by: OCCUPATIONAL THERAPIST

## 2019-01-15 NOTE — PROGRESS NOTES
Daily Note     Today's date: 1/15/2019  Patient name: Lin Chen  : 1985  MRN: [id number]  Referring provider: Veronica Martinez MD  Dx:   Encounter Diagnosis     ICD-10-CM    1  Lateral epicondylitis of right elbow M77 11    2  Medial epicondylitis of right elbow M77 01    3  Neuritis of right ulnar nerve G56 21                   Subjective: "it is just the nerve that hurts"      Objective: See treatment diary below  Assessment: Tolerated treatment well  Patient would benefit from continued OT  Occassional tricep pain, likely medial tricep neural irritation referred from ulnar nerve  Doing well regarding the medial and lateral epicondylitis and soft tissue irritation  He states that he did not tolerate the nighttime splinting for the ulnar nerve well  Plan: Continue per plan of care  Focus on medial epicondyle irritation with lateral epicondyle PRN         Specialty Daily Treatment Diary      Manual   1/3  1/8  1/15  12/28  12/31   IASTM medial epicondyle and flexors  5'   8'  10'  10   IASTM lateral epicondyle and extensors 5'     5'  5   KT PRN             Extrinsic Stretching            Sharl Fanning'  10'  7'    2   Cupping                      Exercise Diary   1/3  1/8  1/15  12/28  12/31   Eccentric WF       3# 3x10   4# 2x 10   Eccentric pronation         hammer 2x10   Green flex bar pro/sup bending  2x10 2x10  2x10  2x10  2x10   MWM              Green flex bar flex/extend  2x10 2x10  2x10  2x10  2x10    GTB wall walking       2x10  2x10    Yellow ext web  310   3x10 3x10  3x10  3x10    UBE       3'2 6 0 resistance      Blue theraband rows  3x10   3x10  3x10  3x10  3x10    Int/ext rot         Red 3x10 Added to HEP    Concentric strength 3# 3x10   4# 3x10  4# 3x10                                                                                                                        HEP: Isometrics, CFM, Mill's stretches  Issued                 Modalities  1/8  1/15  12/26  12/28  1/3 MHP Pre  10  15'  10'  10'  10'   CP post PRN

## 2019-01-17 ENCOUNTER — OFFICE VISIT (OUTPATIENT)
Dept: OBGYN CLINIC | Facility: CLINIC | Age: 34
End: 2019-01-17
Payer: COMMERCIAL

## 2019-01-17 ENCOUNTER — OFFICE VISIT (OUTPATIENT)
Dept: OCCUPATIONAL THERAPY | Facility: CLINIC | Age: 34
End: 2019-01-17

## 2019-01-17 VITALS
HEART RATE: 77 BPM | SYSTOLIC BLOOD PRESSURE: 123 MMHG | HEIGHT: 73 IN | BODY MASS INDEX: 32.6 KG/M2 | WEIGHT: 246 LBS | DIASTOLIC BLOOD PRESSURE: 84 MMHG

## 2019-01-17 DIAGNOSIS — G56.21 NEURITIS OF RIGHT ULNAR NERVE: ICD-10-CM

## 2019-01-17 DIAGNOSIS — M77.01 MEDIAL EPICONDYLITIS OF RIGHT ELBOW: ICD-10-CM

## 2019-01-17 DIAGNOSIS — M77.11 LATERAL EPICONDYLITIS OF RIGHT ELBOW: Primary | ICD-10-CM

## 2019-01-17 PROCEDURE — 99213 OFFICE O/P EST LOW 20 MIN: CPT | Performed by: SURGERY

## 2019-01-17 NOTE — PROGRESS NOTES
ASSESSMENT/PLAN:      35 y o  male with right medial epicondylitis, lateral epicondylitis and ulnar neuritis with 80 percent improvement of his symptoms with therapy  Richardsoncecilio Harrell will transition to a [facility]  A CSI was disssued due to joint pain to his right elbow, as he has a healthy joint a CSI is not recommended  We will see him back in the office on an as needed basis  The patient verbalized understanding of exam findings and treatment plan  We engaged in the shared decision-making process and treatment options were discussed at length with the patient  Surgical and conservative management discussed today along with risks and benefits  Diagnoses and all orders for this visit:    Lateral epicondylitis of right elbow  -     Ambulatory referral to PT/OT hand therapy; Future    Medial epicondylitis of right elbow  -     Ambulatory referral to PT/OT hand therapy; Future    Neuritis of right ulnar nerve  -     Ambulatory referral to PT/OT hand therapy; Future        Follow Up:  Return if symptoms worsen or fail to improve  To Do Next Visit:  Re-evaluation of current issue    ____________________________________________________________________________________________________________________________________________      CHIEF COMPLAINT:  Chief Complaint   Patient presents with    Right Elbow - Follow-up       SUBJECTIVE:  Tal Tate is a 35y o  year old RHD male who presents to the office today for a follow up regarding right lateral epicondylitis, lateral epicondylitis and ulnar neuritis Richardsoncecilio Harrell has been attending therapy with improvement  Bj Julio Cesar notes that his symptoms are aprox  80 percent improvement in his symptoms  He notes his muscle pain has improved but he is still experiencing joint pain  He notes his numbness and tingling is improving as well with nerve gliding exercises at therapy  I have personally reviewed all the relevant PMH, PSH, SH, FH, Medications and allergies       PAST MEDICAL HISTORY:  History reviewed  No pertinent past medical history  PAST SURGICAL HISTORY:  History reviewed  No pertinent surgical history  FAMILY HISTORY:  History reviewed  No pertinent family history  SOCIAL HISTORY:  Social History   Substance Use Topics    Smoking status: Never Smoker    Smokeless tobacco: Never Used    Alcohol use No       MEDICATIONS:    Current Outpatient Prescriptions:     fexofenadine (ALLEGRA) 180 MG tablet, Take 180 mg by mouth daily, Disp: , Rfl:     ALLERGIES:  No Known Allergies    REVIEW OF SYSTEMS:  Review of Systems   Constitutional: Negative for chills, fever and unexpected weight change  HENT: Negative for hearing loss, nosebleeds and sore throat  Eyes: Negative for pain, redness and visual disturbance  Respiratory: Negative for cough, shortness of breath and wheezing  Cardiovascular: Negative for chest pain, palpitations and leg swelling  Gastrointestinal: Negative for abdominal pain, nausea and vomiting  Endocrine: Negative for polydipsia and polyuria  Genitourinary: Negative for difficulty urinating and hematuria  Musculoskeletal: Positive for arthralgias  Negative for joint swelling and myalgias  Skin: Negative for rash and wound  Neurological: Negative for dizziness, numbness and headaches  Psychiatric/Behavioral: Negative for decreased concentration, dysphoric mood and suicidal ideas  The patient is not nervous/anxious          VITALS:  Vitals:    01/17/19 1157   BP: 123/84   Pulse: 77       LABS:  HgA1c: No results found for: HGBA1C  BMP: No results found for: GLUCOSE, CALCIUM, NA, K, CO2, CL, BUN, CREATININE    _____________________________________________________  PHYSICAL EXAMINATION:  General: well developed and well nourished, alert, oriented times 3 and appears comfortable  Psychiatric: Normal  HEENT: Normocephalic, Atraumatic Trachea Midline, No torticollis  Pulmonary: No audible wheezing or respiratory distress   Cardiovascular: No pitting edema, 2+ radial pulse   Skin: No masses, erthema, lacerations, fluctation, ulcerations  Neurovascular: Sensation Intact to the Median, Ulnar, Radial Nerve, Motor Intact to the Median, Ulnar, Radial Nerve and Pulses Intact  Musculoskeletal: Normal, except as noted in detailed exam and in HPI  MUSCULOSKELETAL EXAMINATION:    Right Elbow:  There is no swelling present  There is no ecchymosis noted  The ROM is full in all planes of motion, no ulnar nerve subluxation  - tinels at the elbow, - ulnar nerve compression  Mild forearm pain with resistance to wrist extension, not with resistance to forearm supination or pronation or wrist flexion  Non TTP over medial and lateral epicondyles       There is no instability with varus or valgus stress    ___________________________________________________  STUDIES REVIEWED:  No new imaging to review           PROCEDURES PERFORMED:  Procedures  No Procedures performed today    _____________________________________________________      Carlos Quinn    I,:   Venus Jesus am acting as a scribe while in the presence of the attending physician :        I,:   Miguelito Mane MD personally performed the services described in this documentation    as scribed in my presence :

## 2019-01-17 NOTE — PROGRESS NOTES
OT Discharge     Today's date: 2019  Patient name: Georges Dowell  : 1985  MRN: [id number]  Referring provider: No ref  provider found  Dx:   Encounter Diagnosis     ICD-10-CM    1  Lateral epicondylitis of right elbow M77 11    2  Medial epicondylitis of right elbow M77 01    3  Neuritis of right ulnar nerve G56 21        Start Time: 1240  Stop Time: 1250  Total time in clinic (min): 10 minutes    Assessment  Assessment details: Referred with the formal diagnosis of R medial epicondylitis, R cubital tunnel syndrome, and minor R lateral epicondylitis  He has returned to the MD and is discontinuing tx before a formal re-eval is taking place  Please refer to treatment notes for progress  Goal progress based on clinical judgement  Impairments: abnormal or restricted ROM, activity intolerance, impaired physical strength and pain with function    Symptom irritability: lowUnderstanding of Dx/Px/POC: excellent  Goals  STG: Patient will be compliant with ergonomic recommendations and home exercise program in 2 weeks  - MET  STG: Pain will be reduced by 25% in 4 weeks  - MET   STG: Strength will be improved by 25% in 4 weeks  - MET    LTG: Pain will be reduced by 50% in 6 weeks  - PARTIALLY MET    LTG: Strength will be improved by 50% in 6-8 weeks  - PARTIALLY MET  LTG: Performance in ADLs and IADLS will be improved to prior level of function with the affected extremity within 6 weeks  - PARTIALLY MET  LTG: Performance in work  activity will be improved to prior level of function with the affected extremity within 6 weeks  - PARTIALLY MET  LTG: FOTO score increase by 10 points within 6 weeks  - MET         Plan  Plan details: He was provided with an updated HEP after his follow up with the MD [place] stronger grazyna for eccentric wrist exercises  Instructed to progress to concentric wrist curls with 4# once able  HEP also included continued stretching and ulnar nerve gliding   He can return to therapy if needed in the future  Treatment plan discussed with: patient        Subjective Evaluation    History of Present Illness  Mechanism of injury: Referred for RUE pain  States symptoms that began over the summer and the medial elbow is more painful than the lateral elbow  Pain  Current pain ratin  At best pain ratin  At worst pain ratin (On medial elbow)    Social Support    Employment status: working ( at [employer] )  Hand dominance: right    Treatments  Current treatment: occupational therapy  Patient Goals  Patient goals for therapy: increased strength, decreased pain, increased motion, independence with ADLs/IADLs and return to sport/leisure activities          Objective     Tenderness     Right Elbow   Tenderness in the cubital tunnel, lateral epicondyle and medial epicondyle  Right Wrist/Hand   Tenderness in the common extensor tendon, lateral epicondyle and medial epicondyle  Additional Tenderness Details  +radial tunnel     Active Range of Motion     Right Elbow   Normal active range of motion    Right Wrist   Normal active range of motion    Strength/Myotome Testing     Left Wrist/Hand      (2nd hand position)     Trial 1: 105    Right Wrist/Hand      (2nd hand position)     Trial 1: 50    Comments: +painful stress position gripping, 5/10 pain     Tests     Right Elbow   Positive Tinel's sign (cubital tunnel)  Negative active medial epicondylitis, Cozen's, elbow flexion, Mill's and passive medial epicondylitis       Additional Tests Details  +3rd finger extension test

## 2020-06-17 ENCOUNTER — TELEPHONE (OUTPATIENT)
Dept: FAMILY MEDICINE CLINIC | Facility: CLINIC | Age: 35
End: 2020-06-17

## 2020-07-21 ENCOUNTER — OFFICE VISIT (OUTPATIENT)
Dept: FAMILY MEDICINE CLINIC | Facility: CLINIC | Age: 35
End: 2020-07-21
Payer: COMMERCIAL

## 2020-07-21 VITALS
TEMPERATURE: 97.5 F | BODY MASS INDEX: 28.36 KG/M2 | OXYGEN SATURATION: 98 % | RESPIRATION RATE: 16 BRPM | DIASTOLIC BLOOD PRESSURE: 76 MMHG | WEIGHT: 214 LBS | HEART RATE: 68 BPM | HEIGHT: 73 IN | SYSTOLIC BLOOD PRESSURE: 118 MMHG

## 2020-07-21 DIAGNOSIS — Z00.00 ENCOUNTER FOR ANNUAL PHYSICAL EXAM: Primary | ICD-10-CM

## 2020-07-21 DIAGNOSIS — R63.4 WEIGHT DECREASED: ICD-10-CM

## 2020-07-21 DIAGNOSIS — R53.83 FATIGUE, UNSPECIFIED TYPE: ICD-10-CM

## 2020-07-21 PROCEDURE — 99395 PREV VISIT EST AGE 18-39: CPT | Performed by: FAMILY MEDICINE

## 2020-07-21 PROCEDURE — 3008F BODY MASS INDEX DOCD: CPT | Performed by: FAMILY MEDICINE

## 2020-07-21 RX ORDER — FLUTICASONE PROPIONATE 50 MCG
1 SPRAY, SUSPENSION (ML) NASAL DAILY
COMMUNITY

## 2020-07-21 NOTE — PROGRESS NOTES
Assessment/Plan: Annual PE         Problem List Items Addressed This Visit     None      Visit Diagnoses     Weight decreased    -  Primary    Wt 241 at last OV in Apr 2019  Now 214  Was 253 at home  Encounter for annual physical exam        no labs in 10 yrs-- definitely do NOW  Wt down in one yr from 241 to 214  Self imposed diet  Subjective:      Patient ID: Rogelio Lopez is a 29 y o  male  29 y o [facility] [employer]  No real issues  Wt down 27 lbs from one yr ago, but 39 since January  He is doing well with no complaints  He has a 3-2/2year-old daughter at home  His wife teaches in [facility] [employer] autistic children in the  or so age range  The following portions of the patient's history were reviewed and updated as appropriate:   He has a past medical history of Seasonal allergies and Vitamin D deficiency  ,  does not have any pertinent problems on file  ,   has a past surgical history that includes [place] tooth extraction  ,  family history includes No Known Problems in his father and mother  ,   reports that he has never smoked  He has never used smokeless tobacco  He reports that he does not drink alcohol or use drugs  ,  has No Known Allergies     Current Outpatient Medications   Medication Sig Dispense Refill    fexofenadine (ALLEGRA) 180 MG tablet Take 180 mg by mouth daily      fluticasone (FLONASE) 50 mcg/act nasal spray 1 spray into each nostril daily       No current facility-administered medications for this visit  Review of Systems   Constitutional: Negative for activity change, appetite change, chills, diaphoresis, fatigue, fever and unexpected weight change  HENT: Negative for congestion, dental problem, drooling, ear discharge, ear pain, facial swelling, mouth sores, nosebleeds, postnasal drip, rhinorrhea, trouble swallowing and voice change  Eyes: Negative for photophobia, pain, discharge, redness, itching and visual disturbance  Respiratory: Negative for apnea, cough, choking, chest tightness and shortness of breath  Cardiovascular: Negative for chest pain and leg swelling  Gastrointestinal: Negative for abdominal distention, abdominal pain, constipation, diarrhea and nausea  Endocrine: Negative for polydipsia, polyphagia and polyuria  Genitourinary: Negative for decreased urine volume, difficulty urinating, dysuria, enuresis and hematuria  Musculoskeletal: Negative for arthralgias, back pain, gait problem and joint swelling  Skin: Negative for color change, pallor, rash and wound  Allergic/Immunologic: Negative for immunocompromised state  Neurological: Negative for dizziness, seizures, syncope, facial asymmetry, speech difficulty, light-headedness and headaches  Hematological: Negative for adenopathy  Psychiatric/Behavioral: Negative for agitation, behavioral problems, confusion and decreased concentration  Objective:  Vitals:    07/21/20 1532   BP: 118/76   BP Location: Left arm   Patient Position: Sitting   Cuff Size: Standard   Pulse: 68   Resp: 16   Temp: 97 5 °F (36 4 °C)   TempSrc: Temporal   SpO2: 98%   Weight: 97 1 kg (214 lb)   Height: 6' 1" (1 854 m)     Body mass index is 28 23 kg/m²  Physical Exam   Constitutional: He is oriented to person, place, and time  He appears well-developed and well-nourished  HENT:   Head: Normocephalic and atraumatic  Nose: Nose normal    Eyes: Pupils are equal, round, and reactive to light  EOM are normal    Neck: Normal range of motion  Neck supple  No tracheal deviation present  Cardiovascular: Normal rate, regular rhythm and normal heart sounds  Pulmonary/Chest: Effort normal and breath sounds normal  He has no wheezes  Abdominal: Soft  Bowel sounds are normal    Musculoskeletal: Normal range of motion  Lymphadenopathy:     He has no cervical adenopathy  Neurological: He is alert and oriented to person, place, and time     Skin: Skin is warm and dry  He is not diaphoretic  Psychiatric: He has a normal mood and affect  His behavior is normal  Judgment and thought content normal    Nursing note and vitals reviewed

## 2021-03-16 ENCOUNTER — TRANSCRIBE ORDERS (OUTPATIENT)
Dept: LAB | Facility: AMBULARY SURGERY CENTER | Age: 36
End: 2021-03-16

## 2021-05-03 ENCOUNTER — OFFICE VISIT (OUTPATIENT)
Dept: FAMILY MEDICINE CLINIC | Facility: CLINIC | Age: 36
End: 2021-05-03
Payer: COMMERCIAL

## 2021-05-03 VITALS
DIASTOLIC BLOOD PRESSURE: 80 MMHG | SYSTOLIC BLOOD PRESSURE: 132 MMHG | HEART RATE: 71 BPM | WEIGHT: 212.6 LBS | HEIGHT: 73 IN | OXYGEN SATURATION: 98 % | RESPIRATION RATE: 18 BRPM | BODY MASS INDEX: 28.18 KG/M2 | TEMPERATURE: 97.4 F

## 2021-05-03 DIAGNOSIS — Z23 NEED FOR VACCINATION: Primary | ICD-10-CM

## 2021-05-03 DIAGNOSIS — R63.8 INCREASED BMI: ICD-10-CM

## 2021-05-03 PROCEDURE — 99395 PREV VISIT EST AGE 18-39: CPT | Performed by: FAMILY MEDICINE

## 2021-05-03 PROCEDURE — 3725F SCREEN DEPRESSION PERFORMED: CPT | Performed by: FAMILY MEDICINE

## 2021-05-03 PROCEDURE — 3008F BODY MASS INDEX DOCD: CPT | Performed by: FAMILY MEDICINE

## 2021-05-03 PROCEDURE — 1036F TOBACCO NON-USER: CPT | Performed by: FAMILY MEDICINE

## 2021-05-03 NOTE — PATIENT INSTRUCTIONS
Weight Management   AMBULATORY CARE:   Why it is important to manage your weight:  Being overweight increases your risk of health conditions such as heart disease, high blood pressure, type 2 diabetes, and certain types of cancer  It can also increase your risk for osteoarthritis, sleep apnea, and other respiratory problems  Aim for a slow, steady weight loss  Even a small amount of weight loss can lower your risk of health problems  How to lose weight safely:  A safe and healthy way to lose weight is to eat fewer calories and get regular exercise  · You can lose up about 1 pound a week by decreasing the number of calories you eat by 500 calories each day  You can decrease calories by eating smaller portion sizes or by cutting out high-calorie foods  Read labels to find out how many calories are in the foods you eat  · You can also burn calories with exercise such as walking, swimming, or biking  You will be more likely to keep weight off if you make these changes part of your lifestyle  Exercise at least 30 minutes per day on most days of the week  You can also fit in more physical activity by taking the stairs instead of the elevator or parking farther away from stores  Ask your healthcare provider about the best exercise plan for you  Healthy meal plan for weight management:  A healthy meal plan includes a variety of foods, contains fewer calories, and helps you stay healthy  A healthy meal plan includes the following:     · Eat whole-grain foods more often  A healthy meal plan should contain fiber  Fiber is the part of grains, fruits, and vegetables that is not broken down by your body  Whole-grain foods are healthy and provide extra fiber in your diet  Some examples of whole-grain foods are whole-wheat breads and pastas, oatmeal, brown rice, and bulgur  · Eat a variety of vegetables every day  Include dark, leafy greens such as spinach, kale, roxann greens, and mustard greens   Eat yellow and orange vegetables such as carrots, sweet potatoes, and winter squash  · Eat a variety of fruits every day  Choose fresh or canned fruit (canned in its own juice or light syrup) instead of juice  Fruit juice has very little or no fiber  · Eat low-fat dairy foods  Drink fat-free (skim) milk or 1% milk  Eat fat-free yogurt and low-fat cottage cheese  Try low-fat cheeses such as mozzarella and other reduced-fat cheeses  · Choose meat and other protein foods that are low in fat  Choose beans or other legumes such as split peas or lentils  Choose fish, skinless poultry (chicken or turkey), or lean cuts of red meat (beef or pork)  Before you cook meat or poultry, cut off any visible fat  · Use less fat and oil  Try baking foods instead of frying them  Add less fat, such as margarine, sour cream, regular salad dressing and mayonnaise to foods  Eat fewer high-fat foods  Some examples of high-fat foods include french fries, doughnuts, ice cream, and cakes  · Eat fewer sweets  Limit foods and drinks that are high in sugar  This includes candy, cookies, regular soda, and sweetened drinks  Ways to decrease calories:   · Eat smaller portions  ? Use a small plate with smaller servings  ? Do not eat second helpings  ? When you eat at a restaurant, ask for a box and place half of your meal in the box before you eat  ? Share an entrée with someone else  · Replace high-calorie snacks with healthy, low-calorie snacks  ? Choose fresh fruit, vegetables, fat-free rice cakes, or air-popped popcorn instead of potato chips, nuts, or chocolate  ? Choose water or calorie-free drinks instead of soda or sweetened drinks  · Do not shop for groceries when you are hungry  You may be more likely to make unhealthy food choices  Take a grocery list of healthy foods and shop after you have eaten  · Eat regular meals  Do not skip meals  Skipping meals can lead to overeating later in the day   This can make it harder for you to lose weight  Eat a healthy snack in place of a meal if you do not have time to eat a regular meal  Talk with a dietitian to help you create a meal plan and schedule that is right for you  Other things to consider as you try to lose weight:   · Be aware of situations that may give you the urge to overeat, such as eating while watching television  Find ways to avoid these situations  For example, read a book, go for a walk, or do crafts  · Meet with a weight loss support group or friends who are also trying to lose weight  This may help you stay motivated to continue working on your weight loss goals  © Copyright [address] Information is for End User's use only and may not be sold, redistributed or otherwise used for commercial purposes  All illustrations and images included in CareNotes® are the copyrighted property of [employer]  or [facility] Devonte Calvin   The above information is an  only  It is not intended as medical advice for individual conditions or treatments  Talk to your doctor, nurse or pharmacist before following any medical regimen to see if it is safe and effective for you

## 2021-05-03 NOTE — PROGRESS NOTES
Assessment/Plan:  28 y o male, well-known to me for many years presents for f/u and evaluation of the following medical issues:     Pt here for q other yr PE to work at the [employer]  Pt feels well and no c/o  Problem List Items Addressed This Visit     None      Visit Diagnoses     Need for vaccination    -  Primary    Had both shots:  last on in Feb    Increased BMI        Was 253 lbs in Jan 2020, now 212 lb  Is 6'1"            Subjective: 28 y o male in NAD     Patient ID: Veda Rothman is a 28 y o  male  HPI --  [facility] for [facility]  The following portions of the patient's history were reviewed and updated as appropriate:   Past Medical History:  He has a past medical history of Seasonal allergies and Vitamin D deficiency  ,  _______________________________________________________________________  Medical Problems:  does not have any pertinent problems on file ,  _______________________________________________________________________  Past Surgical History:   has a past surgical history that includes [place] tooth extraction  ,  _______________________________________________________________________  Family History:  family history includes No Known Problems in his father and mother ,  _______________________________________________________________________  Social History:   reports that he has never smoked  He has never used smokeless tobacco  He reports that he does not drink alcohol or use drugs  ,  _______________________________________________________________________  Allergies:  has No Known Allergies     _______________________________________________________________________  Current Outpatient Medications   Medication Sig Dispense Refill    fexofenadine (ALLEGRA) 180 MG tablet Take 180 mg by mouth daily      fluticasone (FLONASE) 50 mcg/act nasal spray 1 spray into each nostril daily      Glucosamine-Chondroit-Vit C-Mn (GLUCOSAMINE 1500 COMPLEX PO) Take 3 tablets by mouth daily       No current facility-administered medications for this visit       _______________________________________________________________________  Review of Systems   Constitutional: Negative for activity change, appetite change, chills, diaphoresis, fatigue, fever and unexpected weight change  HENT: Negative for congestion, dental problem, drooling, ear discharge, ear pain, facial swelling, mouth sores, nosebleeds, postnasal drip, rhinorrhea, trouble swallowing and voice change  Eyes: Negative for photophobia, pain, discharge, redness, itching and visual disturbance  Respiratory: Negative for apnea, cough, choking, chest tightness and shortness of breath  Denies any and all cardiac symptoms   Cardiovascular: Negative for chest pain and leg swelling  Denies any and all cardiac symptoms   Gastrointestinal: Negative for abdominal distention, abdominal pain, constipation, diarrhea and nausea  Endocrine: Negative for polydipsia, polyphagia and polyuria  Genitourinary: Negative for decreased urine volume, difficulty urinating, dysuria, enuresis and hematuria  Musculoskeletal: Negative for arthralgias, back pain, gait problem and joint swelling  Skin: Negative for color change, pallor, rash and wound  Allergic/Immunologic: Negative for immunocompromised state  Neurological: Negative for dizziness, seizures, syncope, facial asymmetry, speech difficulty, light-headedness and headaches  Hematological: Negative for adenopathy  Psychiatric/Behavioral: Negative for agitation, behavioral problems, confusion and decreased concentration  Objective:  Vitals:    05/03/21 1125   BP: 132/80   Pulse: 71   Resp: 18   Temp: (!) 97 4 °F (36 3 °C)   SpO2: 98%   Weight: 96 4 kg (212 lb 9 6 oz)   Height: 6' 1" (1 854 m)     Body mass index is 28 05 kg/m²  Physical Exam  Vitals signs and nursing note reviewed  Constitutional:       Appearance: He is well-developed   He is not diaphoretic  HENT:      Head: Normocephalic and atraumatic  Nose: Nose normal    Eyes:      Pupils: Pupils are equal, round, and reactive to light  Neck:      Musculoskeletal: Normal range of motion and neck supple  Trachea: No tracheal deviation  Cardiovascular:      Rate and Rhythm: Normal rate and regular rhythm  Heart sounds: Normal heart sounds  No murmur  Pulmonary:      Effort: Pulmonary effort is normal       Breath sounds: Normal breath sounds  No wheezing, rhonchi or rales  Chest:      Chest wall: No tenderness  Abdominal:      General: Bowel sounds are normal       Palpations: Abdomen is soft  Musculoskeletal: Normal range of motion  Lymphadenopathy:      Cervical: No cervical adenopathy  Skin:     General: Skin is warm and dry  Neurological:      General: No focal deficit present  Mental Status: He is alert and oriented to person, place, and time  Psychiatric:         Mood and Affect: Mood normal          Behavior: Behavior normal          Thought Content:  Thought content normal          Judgment: Judgment normal

## 2021-05-04 ENCOUNTER — APPOINTMENT (OUTPATIENT)
Dept: LAB | Facility: AMBULARY SURGERY CENTER | Age: 36
End: 2021-05-04
Payer: COMMERCIAL

## 2021-05-04 DIAGNOSIS — R53.83 FATIGUE, UNSPECIFIED TYPE: ICD-10-CM

## 2021-05-04 DIAGNOSIS — R63.4 WEIGHT DECREASED: ICD-10-CM

## 2021-05-04 DIAGNOSIS — Z00.00 ENCOUNTER FOR ANNUAL PHYSICAL EXAM: ICD-10-CM

## 2021-05-04 LAB
25(OH)D3 SERPL-MCNC: 38.4 NG/ML (ref 30–100)
ALBUMIN SERPL BCP-MCNC: 4.9 G/DL (ref 3.5–5)
ALP SERPL-CCNC: 53 U/L (ref 46–116)
ALT SERPL W P-5'-P-CCNC: 31 U/L (ref 12–78)
ANION GAP SERPL CALCULATED.3IONS-SCNC: 6 MMOL/L (ref 4–13)
AST SERPL W P-5'-P-CCNC: 16 U/L (ref 5–45)
BILIRUB SERPL-MCNC: 0.76 MG/DL (ref 0.2–1)
BILIRUB UR QL STRIP: NEGATIVE
BUN SERPL-MCNC: 18 MG/DL (ref 5–25)
CALCIUM SERPL-MCNC: 9.7 MG/DL (ref 8.3–10.1)
CHLORIDE SERPL-SCNC: 106 MMOL/L (ref 100–108)
CHOLEST SERPL-MCNC: 218 MG/DL (ref 50–200)
CLARITY UR: CLEAR
CO2 SERPL-SCNC: 27 MMOL/L (ref 21–32)
COLOR UR: YELLOW
CREAT SERPL-MCNC: 1.01 MG/DL (ref 0.6–1.3)
ERYTHROCYTE [DISTWIDTH] IN BLOOD BY AUTOMATED COUNT: 12.1 % (ref 11.6–15.1)
GFR SERPL CREATININE-BSD FRML MDRD: 96 ML/MIN/1.73SQ M
GLUCOSE P FAST SERPL-MCNC: 79 MG/DL (ref 65–99)
GLUCOSE UR STRIP-MCNC: NEGATIVE MG/DL
HCT VFR BLD AUTO: 51.6 % (ref 36.5–49.3)
HDLC SERPL-MCNC: 65 MG/DL
HGB BLD-MCNC: 16.5 G/DL (ref 12–17)
HGB UR QL STRIP.AUTO: NEGATIVE
KETONES UR STRIP-MCNC: NEGATIVE MG/DL
LDLC SERPL CALC-MCNC: 140 MG/DL (ref 0–100)
LEUKOCYTE ESTERASE UR QL STRIP: NEGATIVE
MCH RBC QN AUTO: 29 PG (ref 26.8–34.3)
MCHC RBC AUTO-ENTMCNC: 32 G/DL (ref 31.4–37.4)
MCV RBC AUTO: 91 FL (ref 82–98)
NITRITE UR QL STRIP: NEGATIVE
NONHDLC SERPL-MCNC: 153 MG/DL
PH UR STRIP.AUTO: 6 [PH]
PLATELET # BLD AUTO: 185 THOUSANDS/UL (ref 149–390)
PMV BLD AUTO: 12.4 FL (ref 8.9–12.7)
POTASSIUM SERPL-SCNC: 4.3 MMOL/L (ref 3.5–5.3)
PROT SERPL-MCNC: 8.3 G/DL (ref 6.4–8.2)
PROT UR STRIP-MCNC: NEGATIVE MG/DL
RBC # BLD AUTO: 5.68 MILLION/UL (ref 3.88–5.62)
SODIUM SERPL-SCNC: 139 MMOL/L (ref 136–145)
SP GR UR STRIP.AUTO: 1.02 (ref 1–1.03)
TRIGL SERPL-MCNC: 63 MG/DL
TSH SERPL DL<=0.05 MIU/L-ACNC: 1.88 UIU/ML (ref 0.36–3.74)
UROBILINOGEN UR QL STRIP.AUTO: 0.2 E.U./DL
WBC # BLD AUTO: 5.28 THOUSAND/UL (ref 4.31–10.16)

## 2021-05-04 PROCEDURE — 80053 COMPREHEN METABOLIC PANEL: CPT

## 2021-05-04 PROCEDURE — 80061 LIPID PANEL: CPT

## 2021-05-04 PROCEDURE — 84443 ASSAY THYROID STIM HORMONE: CPT

## 2021-05-04 PROCEDURE — 85027 COMPLETE CBC AUTOMATED: CPT

## 2021-05-04 PROCEDURE — 82306 VITAMIN D 25 HYDROXY: CPT

## 2021-05-04 PROCEDURE — 81003 URINALYSIS AUTO W/O SCOPE: CPT | Performed by: FAMILY MEDICINE

## 2021-05-04 PROCEDURE — 36415 COLL VENOUS BLD VENIPUNCTURE: CPT

## 2021-12-11 ENCOUNTER — HOSPITAL ENCOUNTER (OUTPATIENT)
Dept: RADIOLOGY | Facility: HOSPITAL | Age: 36
Discharge: HOME | End: 2021-12-11
Payer: COMMERCIAL

## 2021-12-11 ENCOUNTER — OFFICE VISIT (OUTPATIENT)
Dept: OBGYN CLINIC | Facility: CLINIC | Age: 36
End: 2021-12-11
Payer: COMMERCIAL

## 2021-12-11 VITALS — WEIGHT: 224.4 LBS | BODY MASS INDEX: 29.74 KG/M2 | HEIGHT: 73 IN

## 2021-12-11 DIAGNOSIS — M54.2 NECK PAIN: ICD-10-CM

## 2021-12-11 DIAGNOSIS — M54.2 NECK PAIN: Primary | ICD-10-CM

## 2021-12-11 DIAGNOSIS — M62.838 CERVICAL PARASPINAL MUSCLE SPASM: ICD-10-CM

## 2021-12-11 PROCEDURE — 3008F BODY MASS INDEX DOCD: CPT | Performed by: PHYSICIAN ASSISTANT

## 2021-12-11 PROCEDURE — 72040 X-RAY EXAM NECK SPINE 2-3 VW: CPT

## 2021-12-11 PROCEDURE — 1036F TOBACCO NON-USER: CPT | Performed by: PHYSICIAN ASSISTANT

## 2021-12-11 PROCEDURE — 99213 OFFICE O/P EST LOW 20 MIN: CPT | Performed by: PHYSICIAN ASSISTANT

## 2021-12-11 RX ORDER — PREDNISONE 10 MG/1
TABLET ORAL
Qty: 28 TABLET | Refills: 0 | Status: SHIPPED | OUTPATIENT
Start: 2021-12-11

## 2022-01-25 ENCOUNTER — CONSULT (OUTPATIENT)
Dept: PAIN MEDICINE | Facility: CLINIC | Age: 37
End: 2022-01-25
Payer: COMMERCIAL

## 2022-01-25 VITALS
HEART RATE: 71 BPM | DIASTOLIC BLOOD PRESSURE: 85 MMHG | SYSTOLIC BLOOD PRESSURE: 133 MMHG | WEIGHT: 226 LBS | BODY MASS INDEX: 29.82 KG/M2

## 2022-01-25 DIAGNOSIS — M47.812 CERVICAL SPONDYLOSIS: ICD-10-CM

## 2022-01-25 DIAGNOSIS — G89.4 CHRONIC PAIN SYNDROME: Primary | ICD-10-CM

## 2022-01-25 DIAGNOSIS — M79.18 MYOFASCIAL PAIN SYNDROME: ICD-10-CM

## 2022-01-25 DIAGNOSIS — M47.812 CERVICAL FACET JOINT SYNDROME: ICD-10-CM

## 2022-01-25 PROCEDURE — 99204 OFFICE O/P NEW MOD 45 MIN: CPT | Performed by: PHYSICAL MEDICINE & REHABILITATION

## 2022-01-25 RX ORDER — MELOXICAM 15 MG/1
15 TABLET ORAL DAILY
Qty: 30 TABLET | Refills: 1 | Status: SHIPPED | OUTPATIENT
Start: 2022-01-25

## 2022-01-25 NOTE — PROGRESS NOTES
Assessment  1  Chronic pain syndrome    2  Myofascial pain syndrome    3  Cervical spondylosis    4  Cervical facet joint syndrome        Plan  Mr Daniela Pratt is a pleasant 40-year-old male who presents for initial evaluation regarding several years duration of neck pain with intermittent radiating symptoms into the left shoulder  During today's evaluation he is demonstrating pain that is likely multifactorial nature with majority of his pain appears to be generating from the cervical facets and myofascial cervical paraspinal musculature  He has not done any formal physical therapy and has had minimal relief with over-the-counter NSAIDs  At this time we will   1  Provide the patient with home physician guided neck exercises to be done 3 times per week for 4 weeks or longer if needed  I did offer the patient physical therapy but he reports not wanting to go due to the ongoing COVID 19 pandemic  2  Offered the patient left-sided cervical medial branch blocks which he will consider and get back to us   3  Will provide meloxicam 15 mg daily  4  Will await patient's phone call back regarding interventional approaches to manage his pain  My impressions and treatment recommendations were discussed in detail with the patient who verbalized understanding and had no further questions  Discharge instructions were provided  I personally saw and examined the patient and I agree with the above discussed plan of care  No orders of the defined types were placed in this encounter  New Medications Ordered This Visit   Medications    meloxicam (MOBIC) 15 mg tablet     Sig: Take 1 tablet (15 mg total) by mouth daily     Dispense:  30 tablet     Refill:  1       History of Present Illness    Elif Dhillon is a 39 y o  male presents to [place] [address] [facility] for initial evaluation regarding 3-4 years duration of neck pain with intermittent radiating symptoms into the left shoulder    Patient reports the pain is mild and rated 0 to a 6 out of 10 depending upon time  He states the pain does interfere with daily activities and reports that is intermittent 30-60% of the time  Describes the pain as shooting, sharp, dull aching pain  Denies any significant weakness or falls  Does not use any durable medical equipment for ambulation  Symptoms are worse with bending, exercise, coughing, sneezing  Has had no significant relief with heat  Previously tried over-the-counter NSAIDs including Motrin and Tylenol with minimal relief  Presents today for initial evaluation    I have personally reviewed and/or updated the patient's past medical history, past surgical history, family history, social history, current medications, allergies, and vital signs today  Review of Systems   Constitutional: Negative for fever and unexpected weight change  HENT: Negative for trouble swallowing  Eyes: Negative for visual disturbance  Respiratory: Negative for shortness of breath and wheezing  Cardiovascular: Negative for chest pain and palpitations  Gastrointestinal: Negative for constipation, diarrhea, nausea and vomiting  Endocrine: Negative for cold intolerance, heat intolerance and polydipsia  Genitourinary: Negative for difficulty urinating and frequency  Musculoskeletal: Positive for neck pain and neck stiffness  Negative for arthralgias, gait problem, joint swelling and myalgias  Skin: Negative for rash  Neurological: Negative for dizziness, seizures, syncope, weakness and headaches  Hematological: Does not bruise/bleed easily  Psychiatric/Behavioral: Negative for dysphoric mood  All other systems reviewed and are negative        Patient Active Problem List   Diagnosis    Vitamin D deficiency    Seasonal allergies       Past Medical History:   Diagnosis Date    Seasonal allergies     Vitamin D deficiency        Past Surgical History:   Procedure Laterality Date    WISDOM TOOTH EXTRACTION Family History   Problem Relation Age of Onset    No Known Problems Mother     No Known Problems Father        Social History     Occupational History    Not on file   Tobacco Use    Smoking status: Never Smoker    Smokeless tobacco: Never Used   Vaping Use    Vaping Use: Never used   Substance and Sexual Activity    Alcohol use: No    Drug use: No    Sexual activity: Not Currently       Current Outpatient Medications on File Prior to Visit   Medication Sig    fexofenadine (ALLEGRA) 180 MG tablet Take 180 mg by mouth daily    fluticasone (FLONASE) 50 mcg/act nasal spray 1 spray into each nostril daily    Glucosamine-Chondroit-Vit C-Mn (GLUCOSAMINE 1500 COMPLEX PO) Take 3 tablets by mouth daily    predniSONE 10 mg tablet 7 tabs po day 1, 6 tabs po day 2, 5 tabs po day 3, 4 tabs po day 4, 3 tabs po day 5, 2 tabs po day 6, 1 tab po day 7, Then stop     No current facility-administered medications on file prior to visit  No Known Allergies    Physical Exam    /85   Pulse 71   Wt 103 kg (226 lb)   BMI 29 82 kg/m²     Constitutional: normal, well developed, well nourished, alert, in no distress and non-toxic and no overt pain behavior    Eyes: anicteric  HEENT: grossly intact  Neck: supple, symmetric, trachea midline and no masses   Pulmonary:even and unlabored  Cardiovascular:No edema or pitting edema present  Skin:Normal without rashes or lesions and well hydrated  Psychiatric:Mood and affect appropriate  Neurologic:Cranial Nerves II-XII grossly intact  Musculoskeletal:normal, tenderness to palpation left-sided cervical paraspinals and left trap, decreased active and passive range of motion with cervical extension, left side bending and rotation limited by pain, MMT 5/5 bilateral upper extremities, sensation grossly intact to light touch, DTRs within normal limits, negative Spurling bilaterally, positive cervical compression with palpation of left-sided facets with axial loading and rotational forces to the left    Imaging    CERVICAL SPINE     INDICATION:   M54 2: Cervicalgia      COMPARISON:  None     VIEWS:  XR SPINE CERVICAL 2 OR 3 VW INJURY         FINDINGS:     No fracture or subluxation       Alignment is anatomic     The intervertebral disc spaces are preserved       The prevertebral soft tissues are within normal limits        The lung apices are clear      IMPRESSION:     No acute osseous abnormality

## 2022-01-25 NOTE — PATIENT INSTRUCTIONS
Neck Exercises   WHAT YOU NEED TO KNOW:   Why is it important to do neck exercises? Neck exercises help reduce neck pain, and improve neck movement and strength  Neck exercises also help prevent long-term neck problems  What do I need to know about neck exercises? · Do the exercises every day,  or as often as directed by your healthcare provider  · Move slowly, gently, and smoothly  Avoid fast or jerky motions  · Stand and sit the way your healthcare provider shows you  Good posture may reduce your neck pain  Check your posture often, even when you are not doing your neck exercises  How do I perform neck exercises safely? · Exercise position:  You may sit or stand while you do neck exercises  Face forward  Your shoulders should be straight and relaxed, with a good posture  · Head tilts, forward and back:  Gently bow your head and try to touch your chin to your chest  Your healthcare provider may tell you to push on the back of your neck to help bow your head  Raise your chin back to the starting position  Tilt your head back as far as possible so you are looking up at the ceiling  Your healthcare provider may tell you to lift your chin to help tilt your head back  Return your head to the starting position  · Head tilts, side to side:  Tilt your head, bringing your ear toward your shoulder  Then tilt your head toward the other shoulder  · Head turns:  Turn your head to look over your shoulder  Tilt your chin down and try to touch it to your shoulder  Do not raise your shoulder to your chin  Face forward again  Do the same on the other side  · Head rolls:  Slowly bring your chin toward your chest  Next, roll your head to the right  Your ear should be positioned over your shoulder  Hold this position for 5 seconds  Roll your head back toward your chest and to the left into the same position  Hold for 5 seconds   Gently roll your head back and around in a clockwise [place] 3 times  Next, move your head in the reverse direction (counterclockwise) in a [place] 3 times  Do not shrug your shoulders upwards while you do this exercise  When should I contact my healthcare provider? · Your pain does not get better, or gets worse  · You have questions or concerns about your condition, care, or exercise program     CARE AGREEMENT:   You have the right to help plan your care  Learn about your health condition and how it may be treated  Discuss treatment options with your healthcare providers to decide what care you want to receive  You always have the right to refuse treatment  The above information is an  only  It is not intended as medical advice for individual conditions or treatments  Talk to your doctor, nurse or pharmacist before following any medical regimen to see if it is safe and effective for you  © Copyright [employer] 2021 Information is for End User's use only and may not be sold, redistributed or otherwise used for commercial purposes   All illustrations and images included in CareNotes® are the copyrighted property of A D A M , Inc  or [address] [place] [address]

## 2023-04-26 ENCOUNTER — RA CDI HCC (OUTPATIENT)
Dept: OTHER | Facility: HOSPITAL | Age: 38
End: 2023-04-26

## 2023-05-03 ENCOUNTER — OFFICE VISIT (OUTPATIENT)
Dept: FAMILY MEDICINE CLINIC | Facility: CLINIC | Age: 38
End: 2023-05-03

## 2023-05-03 VITALS
HEART RATE: 70 BPM | WEIGHT: 225.4 LBS | OXYGEN SATURATION: 98 % | HEIGHT: 72 IN | DIASTOLIC BLOOD PRESSURE: 92 MMHG | TEMPERATURE: 98 F | BODY MASS INDEX: 30.53 KG/M2 | SYSTOLIC BLOOD PRESSURE: 140 MMHG

## 2023-05-03 DIAGNOSIS — Z79.899 ENCOUNTER FOR LONG-TERM (CURRENT) USE OF MEDICATIONS: ICD-10-CM

## 2023-05-03 DIAGNOSIS — Z23 NEED FOR IMMUNIZATION WITH DIPHTHERIA, TETANUS, AND POLIOVIRUS VACCINE: ICD-10-CM

## 2023-05-03 DIAGNOSIS — I10 PRIMARY HYPERTENSION: Primary | ICD-10-CM

## 2023-05-03 DIAGNOSIS — Z00.00 ANNUAL PHYSICAL EXAM: ICD-10-CM

## 2023-05-03 DIAGNOSIS — Z13.29 THYROID DISORDER SCREEN: ICD-10-CM

## 2023-05-03 DIAGNOSIS — E78.00 PURE HYPERCHOLESTEROLEMIA: ICD-10-CM

## 2023-05-03 DIAGNOSIS — Z13.0 SCREENING FOR BLOOD DISEASE: ICD-10-CM

## 2023-05-03 DIAGNOSIS — Z11.59 NEED FOR HEPATITIS C SCREENING TEST: ICD-10-CM

## 2023-05-03 DIAGNOSIS — Z13.89 SCREENING FOR BLOOD OR PROTEIN IN URINE: ICD-10-CM

## 2023-05-03 DIAGNOSIS — E55.9 VITAMIN D DEFICIENCY: ICD-10-CM

## 2023-05-03 DIAGNOSIS — Z79.899 MEDICATION MANAGEMENT: ICD-10-CM

## 2023-05-03 RX ORDER — HYDROCHLOROTHIAZIDE 12.5 MG/1
12.5 TABLET ORAL DAILY
Qty: 90 TABLET | Refills: 3 | Status: SHIPPED | OUTPATIENT
Start: 2023-05-03

## 2023-05-03 NOTE — PROGRESS NOTES
ADULT ANNUAL [address],  [address] PRIMARY CARE [place]    NAME: Michelle Cruz  AGE: 40 y o  SEX: male  : 1985     DATE: 5/3/2023     Assessment and Plan:     Problem List Items Addressed This Visit        Other    Vitamin D deficiency    Relevant Orders    Vitamin D 25 hydroxy   Other Visit Diagnoses     Need for hepatitis C screening test    -  Primary    Relevant Orders    Hepatitis C Antibody    Pure hypercholesterolemia        Relevant Orders    Lipid panel    Screening for blood or protein in urine        Relevant Orders    UA w Reflex to Microscopic w Reflex to Culture    Microalbumin / creatinine urine ratio    Thyroid disorder screen        Relevant Orders    TSH, 3rd generation    Annual physical exam        Relevant Orders    CBC    Comprehensive metabolic panel    Hemoglobin A1C    Screening for blood disease        Relevant Orders    CBC    Comprehensive metabolic panel    Need for immunization with diphtheria, tetanus, and poliovirus vaccine        Relevant Orders    TDAP VACCINE GREATER THAN OR EQUAL TO 8YO IM    Primary hypertension        Relevant Medications    hydrochlorothiazide (HYDRODIURIL) 12 5 mg tablet          Immunizations and preventive care screenings were discussed with patient today  Appropriate education was printed on patient's after visit summary  Counseling:  Alcohol/drug use: discussed moderation in alcohol intake, the recommendations for healthy alcohol use, and avoidance of illicit drug use  Dental Health: discussed importance of regular tooth brushing, flossing, and dental visits  Injury prevention: discussed safety/seat belts, safety helmets, smoke detectors, carbon dioxide detectors, and smoking near bedding or upholstery  Sexual health: discussed sexually transmitted diseases, partner selection, use of condoms, avoidance of unintended pregnancy, and contraceptive alternatives    · Exercise: the importance of regular exercise/physical activity was discussed  Recommend exercise 3-5 times per week for at least 30 minutes  Depression Screening and Follow-up Plan: Patient was screened for depression during today's encounter  They screened negative with a PHQ-2 score of 0  No follow-ups on file  Chief Complaint:     Chief Complaint   Patient presents with   • Annual Exam      History of Present Illness:     Adult Annual Physical   Patient here for a comprehensive physical exam  The patient reports no problems  Diet and Physical Activity  · Diet/Nutrition: well balanced diet, limited junk food, low fat diet, low carb diet, limited fruits/vegetables, adequate fiber intake and adequate whole grain intake  · Exercise: walking, moderate cardiovascular exercise, 5-7 times a week on average and 1-2 hours on average  Depression Screening  PHQ-2/9 Depression Screening    Little interest or pleasure in doing things: 0 - not at all  Feeling down, depressed, or hopeless: 0 - not at all  PHQ-2 Score: 0  PHQ-2 Interpretation: Negative depression screen       General Health  · Sleep: sleeps well  · Hearing: normal - bilateral   · Vision: no vision problems  · Dental: regular dental visits, brushes teeth twice daily and flosses teeth occasionally          Health  · History of STDs?: no      Review of Systems:     Review of Systems   Past Medical History:     Past Medical History:   Diagnosis Date   • Ear problems    • HL (hearing loss)    • Seasonal allergies    • Vitamin D deficiency       Past Surgical History:     Past Surgical History:   Procedure Laterality Date   • WISDOM TOOTH EXTRACTION        Social History:     Social History     Socioeconomic History   • Marital status: Single     Spouse name: None   • Number of children: None   • Years of education: None   • Highest education level: None   Occupational History   • Occupation: Director of Facilities at [employer]   Tobacco Use   • Smoking status: Never   • Smokeless tobacco: Never   Vaping Use   • Vaping Use: Never used   Substance and Sexual Activity   • Alcohol use: No   • Drug use: No   • Sexual activity: Not Currently   Other Topics Concern   • None   Social History Narrative    · Most recent tobacco use screenin2019      Social Determinants of Health     Financial Resource Strain: Not on file   Food Insecurity: Not on file   Transportation Needs: Not on file   Physical Activity: Not on file   Stress: Not on file   Social Connections: Not on file   Intimate Partner Violence: Not on file   Housing Stability: Not on file      Family History:     Family History   Problem Relation Age of Onset   • No Known Problems Mother    • No Known Problems Father       Current Medications:     Current Outpatient Medications   Medication Sig Dispense Refill   • fexofenadine (ALLEGRA) 180 MG tablet Take 180 mg by mouth daily     • fluticasone (FLONASE) 50 mcg/act nasal spray 1 spray into each nostril daily     • hydrochlorothiazide (HYDRODIURIL) 12 5 mg tablet Take 1 tablet (12 5 mg total) by mouth daily 90 tablet 3   • meloxicam (MOBIC) 15 mg tablet Take 1 tablet (15 mg total) by mouth daily (Patient taking differently: Take 15 mg by mouth daily PRN) 30 tablet 1     No current facility-administered medications for this visit  Allergies:      Allergies   Allergen Reactions   • Nuts - Food Allergy Anaphylaxis   • Shellfish-Derived Products - Food Allergy Anaphylaxis   • Other Other (See Comments)     Apples, peaches, strawberries, cherries, pineapple, nectarines      Physical Exam:     /92 (BP Location: Left arm, Patient Position: Sitting, Cuff Size: Standard)   Pulse 70   Temp 98 °F (36 7 °C) (Temporal)   Ht 6' (1 829 m)   Wt 102 kg (225 lb 6 4 oz)   SpO2 98%   BMI 30 57 kg/m²     Physical Exam     Brandon Fabian DO   [facility] PRIMARY CARE [place]

## 2023-05-03 NOTE — PROGRESS NOTES
Name: Víctor Carlos      : 1985      MRN: [id number]  Encounter Provider: Yash Edmonds DO  Encounter Date: 5/3/2023   Encounter department: [address] [place] [address]  Primary hypertension  Comments:  New diagnosis--blood pressure 140/82 and higher  Add HCTZ 12 5 mg once daily  RTO 3 weeks for nurse check BP  Orders:  -     hydrochlorothiazide (HYDRODIURIL) 12 5 mg tablet; Take 1 tablet (12 5 mg total) by mouth daily    2  Need for hepatitis C screening test  -     Hepatitis C Antibody; Future    3  Vitamin D deficiency  -     Vitamin D 25 hydroxy; Future    4  Pure hypercholesterolemia  Comments:  See lab report and recheck  Last labs May 4, 2021-cholesterol 218, triglycerides 63, HDL 65,   Stressed low-cholesterol diet  Orders:  -     Lipid panel; Future    5  Screening for blood or protein in urine  -     UA w Reflex to Microscopic w Reflex to Culture  -     Microalbumin / creatinine urine ratio    6  Thyroid disorder screen  -     TSH, 3rd generation; Future    7  Annual physical exam  Comments:  Done today--see second report note  Orders:  -     CBC; Future  -     Comprehensive metabolic panel; Future  -     Hemoglobin A1C; Future    8  Screening for blood disease  -     CBC; Future  -     Comprehensive metabolic panel; Future    9  Need for immunization with diphtheria, tetanus, and poliovirus vaccine  Comments:  Given today left arm  Orders:  -     TDAP VACCINE GREATER THAN OR EQUAL TO 8YO IM    10  Medication management    11  Encounter for long-term (current) use of medications        Subjective      HPI--consult 43-year-old male presents for ongoing medical evaluation and annual physical exam   Works at [employer] home  Medical report form completed  Medications only allergy and rare Mobic for degenerative changes of cervical spine states cholesterol was elevated last year and we will recheck that now      Review of Systems   Constitutional: Negative for activity change, appetite change, chills, diaphoresis, fatigue, fever and unexpected weight change  HENT: Negative for congestion, dental problem, drooling, ear discharge, ear pain, facial swelling, mouth sores, nosebleeds, postnasal drip, rhinorrhea, trouble swallowing and voice change  Eyes: Negative for photophobia, pain, discharge, redness, itching and visual disturbance  Respiratory: Negative for apnea, cough, choking, chest tightness and shortness of breath  Denies any and all cardiac symptoms   Cardiovascular: Negative for chest pain and leg swelling  Denies any and all cardiac symptoms   Gastrointestinal: Negative for abdominal distention, abdominal pain, constipation, diarrhea and nausea  Endocrine: Negative for polydipsia, polyphagia and polyuria  Genitourinary: Negative for decreased urine volume, difficulty urinating, dysuria, enuresis and hematuria  Musculoskeletal: Negative for arthralgias, back pain, gait problem and joint swelling  Skin: Negative for color change, pallor, rash and wound  Allergic/Immunologic: Negative for immunocompromised state  Neurological: Negative for dizziness, seizures, syncope, facial asymmetry, speech difficulty, light-headedness and headaches  Hematological: Negative for adenopathy  Psychiatric/Behavioral: Negative for agitation, behavioral problems, confusion and decreased concentration         Current Outpatient Medications on File Prior to Visit   Medication Sig   • fexofenadine (ALLEGRA) 180 MG tablet Take 180 mg by mouth daily   • fluticasone (FLONASE) 50 mcg/act nasal spray 1 spray into each nostril daily   • meloxicam (MOBIC) 15 mg tablet Take 1 tablet (15 mg total) by mouth daily (Patient taking differently: Take 15 mg by mouth daily PRN)   • [DISCONTINUED] Glucosamine-Chondroit-Vit C-Mn (GLUCOSAMINE 1500 COMPLEX PO) Take 3 tablets by mouth daily (Patient not taking: Reported on 5/3/2023)   • [DISCONTINUED] predniSONE 10 mg tablet 7 tabs po day 1, 6 tabs po day 2, 5 tabs po day 3, 4 tabs po day 4, 3 tabs po day 5, 2 tabs po day 6, 1 tab po day 7, Then stop (Patient not taking: Reported on 5/3/2023)       Objective     /92 (BP Location: Left arm, Patient Position: Sitting, Cuff Size: Standard)   Pulse 70   Temp 98 °F (36 7 °C) (Temporal)   Ht 6' (1 829 m)   Wt 102 kg (225 lb 6 4 oz)   SpO2 98%   BMI 30 57 kg/m²     Physical Exam  Vitals and nursing note reviewed  Constitutional:       Appearance: He is well-developed  He is not diaphoretic  HENT:      Head: Normocephalic and atraumatic  Nose: Nose normal    Eyes:      Pupils: Pupils are equal, round, and reactive to light  Neck:      Trachea: No tracheal deviation  Cardiovascular:      Rate and Rhythm: Normal rate and regular rhythm  Heart sounds: Normal heart sounds  No murmur heard  Pulmonary:      Effort: Pulmonary effort is normal       Breath sounds: Normal breath sounds  No wheezing, rhonchi or rales  Chest:      Chest wall: No tenderness  Abdominal:      General: Bowel sounds are normal       Palpations: Abdomen is soft  Musculoskeletal:         General: Normal range of motion  Cervical back: Normal range of motion and neck supple  Lymphadenopathy:      Cervical: No cervical adenopathy  Skin:     General: Skin is warm and dry  Neurological:      General: No focal deficit present  Mental Status: He is alert and oriented to person, place, and time  Psychiatric:         Mood and Affect: Mood normal          Behavior: Behavior normal          Thought Content: Thought content normal          Judgment: Judgment normal          BMI Counseling: Body mass index is 30 57 kg/m²   The BMI is above normal  Nutrition recommendations include decreasing portion sizes, encouraging healthy choices of fruits and vegetables, decreasing fast food intake, consuming healthier snacks, limiting drinks that contain sugar, moderation in carbohydrate intake, increasing intake of lean protein, reducing intake of saturated and trans fat and reducing intake of cholesterol  Exercise recommendations include moderate physical activity 150 minutes/week and exercising 3-5 times per week  No pharmacotherapy was ordered  Rationale for BMI follow-up plan is due to patient being overweight or obese  Depression Screening and Follow-up Plan: Patient was screened for depression during today's encounter  They screened negative with a PHQ-2 score of 0  I personally reviewed the recent (and prior)  lab results, the image studies, pathology, other specialty/physicians consult notes and recommendations, and outside medical records from other institutions, as appropriate  I had a lengthy discussion with the patient and shared the work-up findings  We discussed the diagnosis and management plan  I spent  30  minutes reviewing the records (labs, clinician notes, outside records, medical history, ordering medicine/tests/procedures, interpreting the imaging/labs previously done) and coordination of care as well as direct time with the patient today, of which greater than 50% of the time was spent in counseling and coordination of care with the patient/family        Dash Davenport, DO

## 2023-05-06 ENCOUNTER — APPOINTMENT (OUTPATIENT)
Dept: LAB | Facility: CLINIC | Age: 38
End: 2023-05-06

## 2023-05-06 DIAGNOSIS — E78.00 PURE HYPERCHOLESTEROLEMIA: ICD-10-CM

## 2023-05-06 DIAGNOSIS — Z11.59 NEED FOR HEPATITIS C SCREENING TEST: ICD-10-CM

## 2023-05-06 DIAGNOSIS — E55.9 VITAMIN D DEFICIENCY: ICD-10-CM

## 2023-05-06 DIAGNOSIS — Z00.00 ANNUAL PHYSICAL EXAM: ICD-10-CM

## 2023-05-06 DIAGNOSIS — Z13.0 SCREENING FOR BLOOD DISEASE: ICD-10-CM

## 2023-05-06 DIAGNOSIS — Z13.29 THYROID DISORDER SCREEN: ICD-10-CM

## 2023-05-06 LAB
25(OH)D3 SERPL-MCNC: 27.4 NG/ML (ref 30–100)
ALBUMIN SERPL BCP-MCNC: 4.9 G/DL (ref 3.5–5)
ALP SERPL-CCNC: 42 U/L (ref 34–104)
ALT SERPL W P-5'-P-CCNC: 17 U/L (ref 7–52)
ANION GAP SERPL CALCULATED.3IONS-SCNC: 5 MMOL/L (ref 4–13)
AST SERPL W P-5'-P-CCNC: 17 U/L (ref 13–39)
BILIRUB SERPL-MCNC: 0.77 MG/DL (ref 0.2–1)
BILIRUB UR QL STRIP: NEGATIVE
BUN SERPL-MCNC: 16 MG/DL (ref 5–25)
CALCIUM SERPL-MCNC: 10 MG/DL (ref 8.4–10.2)
CHLORIDE SERPL-SCNC: 103 MMOL/L (ref 96–108)
CHOLEST SERPL-MCNC: 213 MG/DL
CLARITY UR: CLEAR
CO2 SERPL-SCNC: 31 MMOL/L (ref 21–32)
COLOR UR: NORMAL
CREAT SERPL-MCNC: 0.96 MG/DL (ref 0.6–1.3)
CREAT UR-MCNC: 76.3 MG/DL
ERYTHROCYTE [DISTWIDTH] IN BLOOD BY AUTOMATED COUNT: 12.1 % (ref 11.6–15.1)
GFR SERPL CREATININE-BSD FRML MDRD: 100 ML/MIN/1.73SQ M
GLUCOSE P FAST SERPL-MCNC: 95 MG/DL (ref 65–99)
GLUCOSE UR STRIP-MCNC: NEGATIVE MG/DL
HCT VFR BLD AUTO: 47.7 % (ref 36.5–49.3)
HCV AB SER QL: NORMAL
HDLC SERPL-MCNC: 59 MG/DL
HGB BLD-MCNC: 15.4 G/DL (ref 12–17)
HGB UR QL STRIP.AUTO: NEGATIVE
KETONES UR STRIP-MCNC: NEGATIVE MG/DL
LDLC SERPL CALC-MCNC: 137 MG/DL (ref 0–100)
LEUKOCYTE ESTERASE UR QL STRIP: NEGATIVE
MCH RBC QN AUTO: 28.7 PG (ref 26.8–34.3)
MCHC RBC AUTO-ENTMCNC: 32.3 G/DL (ref 31.4–37.4)
MCV RBC AUTO: 89 FL (ref 82–98)
MICROALBUMIN UR-MCNC: <5 MG/L (ref 0–20)
MICROALBUMIN/CREAT 24H UR: <7 MG/G CREATININE (ref 0–30)
NITRITE UR QL STRIP: NEGATIVE
NONHDLC SERPL-MCNC: 154 MG/DL
PH UR STRIP.AUTO: 6.5 [PH]
PLATELET # BLD AUTO: 233 THOUSANDS/UL (ref 149–390)
PMV BLD AUTO: 11.1 FL (ref 8.9–12.7)
POTASSIUM SERPL-SCNC: 4.8 MMOL/L (ref 3.5–5.3)
PROT SERPL-MCNC: 7.6 G/DL (ref 6.4–8.4)
PROT UR STRIP-MCNC: NEGATIVE MG/DL
RBC # BLD AUTO: 5.36 MILLION/UL (ref 3.88–5.62)
SODIUM SERPL-SCNC: 139 MMOL/L (ref 135–147)
SP GR UR STRIP.AUTO: 1.01 (ref 1–1.03)
TRIGL SERPL-MCNC: 87 MG/DL
TSH SERPL DL<=0.05 MIU/L-ACNC: 1.81 UIU/ML (ref 0.45–4.5)
UROBILINOGEN UR STRIP-ACNC: <2 MG/DL
WBC # BLD AUTO: 5.47 THOUSAND/UL (ref 4.31–10.16)

## 2023-05-07 LAB
EST. AVERAGE GLUCOSE BLD GHB EST-MCNC: 100 MG/DL
HBA1C MFR BLD: 5.1 %

## 2023-05-08 ENCOUNTER — TELEPHONE (OUTPATIENT)
Dept: FAMILY MEDICINE CLINIC | Facility: CLINIC | Age: 38
End: 2023-05-08

## 2023-05-08 NOTE — TELEPHONE ENCOUNTER
----- Message from Ramonita Poole DO sent at 5/7/2023 11:24 AM EDT -----  Tell patient all labs look good with the exception of vitamin D which is too low and therefore he should take vitamin D 2000 IU once daily for a year and then recheck level them  Also cholesterol slightly above normal and is definitely not at goal which should be 150    Hence tell him to google low-cholesterol diet and increase exercise and repeat in 1 year  ----- Message -----  From: Lab, Background User  Sent: 5/6/2023   7:40 AM EDT  To: Ramonita Poole DO

## 2023-05-08 NOTE — TELEPHONE ENCOUNTER
Called and left message to call office   Vit D low needs to take OTC Vit D 2000 units times one year then will be rechecked and start low cholesterol diet

## 2023-05-09 NOTE — TELEPHONE ENCOUNTER
Spoke with patient in regards for blood test and supplement recommendation  Patient had no further questions

## 2024-12-17 ENCOUNTER — TELEPHONE (OUTPATIENT)
Age: 39
End: 2024-12-17

## 2024-12-17 NOTE — TELEPHONE ENCOUNTER
Patient called stating he has pink eye in his right eye. He is asking if PCP would send in a script for this to his [facility]   Please advise

## 2024-12-17 NOTE — TELEPHONE ENCOUNTER
Spoke with patient and offered 2pm appointment with PCP this afternoon and patient declined. States his schedule is slammed this week and that he will go to urgent care when he has time.

## 2025-05-20 ENCOUNTER — OFFICE VISIT (OUTPATIENT)
Dept: FAMILY MEDICINE CLINIC | Facility: CLINIC | Age: 40
End: 2025-05-20
Payer: COMMERCIAL

## 2025-05-20 VITALS
HEIGHT: 72 IN | HEART RATE: 80 BPM | WEIGHT: 234 LBS | TEMPERATURE: 98.7 F | BODY MASS INDEX: 31.69 KG/M2 | OXYGEN SATURATION: 97 % | SYSTOLIC BLOOD PRESSURE: 126 MMHG | DIASTOLIC BLOOD PRESSURE: 82 MMHG

## 2025-05-20 DIAGNOSIS — E55.9 VITAMIN D DEFICIENCY: ICD-10-CM

## 2025-05-20 DIAGNOSIS — J30.2 SEASONAL ALLERGIES: ICD-10-CM

## 2025-05-20 DIAGNOSIS — Z13.89 SCREENING FOR CARDIOVASCULAR, RESPIRATORY, AND GENITOURINARY DISEASES: ICD-10-CM

## 2025-05-20 DIAGNOSIS — E78.2 MIXED HYPERLIPIDEMIA: ICD-10-CM

## 2025-05-20 DIAGNOSIS — Z79.899 ON LONG TERM DRUG THERAPY: ICD-10-CM

## 2025-05-20 DIAGNOSIS — E78.2 MODERATE MIXED HYPERLIPIDEMIA NOT REQUIRING STATIN THERAPY: ICD-10-CM

## 2025-05-20 DIAGNOSIS — Z00.00 ANNUAL PHYSICAL EXAM: Primary | ICD-10-CM

## 2025-05-20 DIAGNOSIS — Z13.83 SCREENING FOR CARDIOVASCULAR, RESPIRATORY, AND GENITOURINARY DISEASES: ICD-10-CM

## 2025-05-20 DIAGNOSIS — Z12.11 SCREEN FOR COLON CANCER: ICD-10-CM

## 2025-05-20 DIAGNOSIS — Z13.6 SCREENING FOR CARDIOVASCULAR, RESPIRATORY, AND GENITOURINARY DISEASES: ICD-10-CM

## 2025-05-20 DIAGNOSIS — Z13.89 SCREENING FOR HEMATURIA OR PROTEINURIA: ICD-10-CM

## 2025-05-20 DIAGNOSIS — Z79.899 MEDICATION MANAGEMENT: ICD-10-CM

## 2025-05-20 DIAGNOSIS — R53.82 CHRONIC FATIGUE: ICD-10-CM

## 2025-05-20 PROCEDURE — 99395 PREV VISIT EST AGE 18-39: CPT | Performed by: FAMILY MEDICINE

## 2025-05-20 PROCEDURE — 99213 OFFICE O/P EST LOW 20 MIN: CPT | Performed by: FAMILY MEDICINE

## 2025-05-20 RX ORDER — ATORVASTATIN CALCIUM 20 MG/1
20 TABLET, FILM COATED ORAL DAILY
Qty: 100 TABLET | Refills: 3 | Status: SHIPPED | OUTPATIENT
Start: 2025-05-20

## 2025-05-20 NOTE — PROGRESS NOTES
Adult Annual Physical  Name: Ramos Riggins      : 1985      MRN: [id number]  Encounter Provider: MICAH Carrera DO  Encounter Date: 2025   [facility]    :  Assessment & Plan  Annual physical exam    Orders:  •  iFOBT (FIT); Future    Chronic fatigue    Orders:  •  CBC; Future  •  TSH, 3rd generation; Future    Screening for hematuria or proteinuria    Orders:  •  UA w Reflex to Microscopic w Reflex to Culture  •  Albumin / creatinine urine ratio    Screening for cardiovascular, respiratory, and genitourinary diseases    Orders:  •  Comprehensive metabolic panel; Future    Mixed hyperlipidemia    Orders:  •  Lipid panel; Future  •  atorvastatin (LIPITOR) 20 mg tablet; Take 1 tablet (20 mg total) by mouth daily    Vitamin D deficiency    Orders:  •  Vitamin D 25 hydroxy; Future    On long term drug therapy         Moderate mixed hyperlipidemia not requiring statin therapy    Orders:  •  Lipoprotein A (LPA); Future    Seasonal allergies         Medication management         Screen for colon cancer    Orders:  •  iFOBT (FIT); Future        Preventive Screenings:  - Diabetes Screening: risks/benefits discussed  - Cholesterol Screening: screening not indicated, has hyperlipidemia and risks/benefits discussed   - Hepatitis C screening: screening up-to-date and risks/benefits discussed   - HIV screening: risks/benefits discussed and patient declines   - Colon cancer screening: risks/benefits discussed and orders placed   - Lung cancer screening: screening not indicated   - Prostate cancer screening: screening not indicated and risks/benefits discussed     Immunizations:    - Risks/benefits immunizations discussed      Counseling/Anticipatory Guidance:  - Alcohol: discussed moderation in alcohol intake and recommendations for healthy alcohol use.   - Drug use: discussed harms of illicit drug use and how it can negatively impact mental/physical health.   - Tobacco  use: discussed harms of tobacco use and management options for quitting.   - Dental health: discussed importance of regular tooth brushing, flossing, and dental visits.   - Sexual health: discussed sexually transmitted diseases, partner selection, use of condoms, avoidance of unintended pregnancy, and contraceptive alternatives.   - Diet: discussed recommendations for a healthy/well-balanced diet.   - Exercise: the importance of regular exercise/physical activity was discussed. Recommend exercise 3-5 times per week for at least 30 minutes.   - Injury prevention: discussed safety/seat belts, safety helmets, smoke detectors, carbon monoxide detectors, and smoking near bedding or upholstery.       Depression Screening and Follow-up Plan: Patient was screened for depression during today's encounter. They screened negative with a PHQ-2 score of 0.        History of Present Illness     Adult Annual Physical:  Patient presents for annual physical.     Diet and Physical Activity:  - Diet/Nutrition: no special diet, limited junk food, adequate whole grain intake and adequate fiber intake. no added salt. Regular intake of protein.  - Exercise: walking, no formal exercise and 5-7 times a week on average.    Depression Screening:  - PHQ-2 Score: 0    General Health:  - Sleep: 7-8 hours of sleep on average and sleeps poorly.  - Hearing: normal hearing bilateral ears.  - Vision: most recent eye exam > 1 year ago.  - Dental: regular dental visits, brushes teeth twice daily and floss regularly.    /GYN Health:    - History of STDs: no     Health:  - History of STDs: no.     Advanced Care Planning:  - Has an advanced directive?: no    - Has a durable medical POA?: no    - ACP document given to patient?: no      Review of Systems      Objective   /82 (BP Location: Left arm, Patient Position: Sitting, Cuff Size: Standard)   Pulse 80   Temp 98.7 °F (37.1 °C) (Temporal)   Ht 6' (1.829 m)   Wt 106 kg (234 lb)   SpO2 97%    BMI 31.74 kg/m²     Physical Exam

## 2025-05-20 NOTE — PROGRESS NOTES
Name: Ramos Riggins      : 1985      MRN: [id number]  Encounter Provider: MICAH Carrera DO  Encounter Date: 2025   Encounter department: [facility] (formerly [facility]ON    :  Assessment & Plan  Annual physical exam    Orders:  •  iFOBT (FIT); Future    Chronic fatigue    Orders:  •  CBC; Future  •  TSH, 3rd generation; Future    Screening for hematuria or proteinuria    Orders:  •  UA w Reflex to Microscopic w Reflex to Culture  •  Albumin / creatinine urine ratio    Screening for cardiovascular, respiratory, and genitourinary diseases    Orders:  •  Comprehensive metabolic panel; Future    Mixed hyperlipidemia    Orders:  •  Lipid panel; Future  •  atorvastatin (LIPITOR) 20 mg tablet; Take 1 tablet (20 mg total) by mouth daily    Vitamin D deficiency    Orders:  •  Vitamin D 25 hydroxy; Future    On long term drug therapy         Moderate mixed hyperlipidemia not requiring statin therapy    Orders:  •  Lipoprotein A (LPA); Future    Seasonal allergies         Medication management         Screen for colon cancer    Orders:  •  iFOBT (FIT); Future      Assessment & Plan  1. Annual physical examination.  - Blood pressure readings are within the normal range. Cholesterol levels have remained stable since , with a slight increase from 213 to 218. Vitamin D levels are suboptimal, registering at 27, down from a previous level of 38. Glucose levels are within the normal range.  - Advised to maintain a low-sodium diet and engage in regular physical activity.  - Prescription for Lipitor 20 mg provided, with instructions to take it for a period of 3 to 4 months, after which cholesterol levels will be reassessed.  - Advised to take over-the-counter vitamin D3 at a dosage of 5000 IU daily for a duration of 1 to 2 years, followed by a blood test to determine the necessity of continued supplementation.  - Comprehensive lab workup, including CBC, urine, CMP, lipid panel, TSH, vitamin D, and lipoprotein, has  been ordered.  - Instructed to fast for 8 hours prior to the lab work.  - FIT test also ordered.  - Encouraged to continue with exercise regimen and maintain a healthy diet.    Follow-up  - Follow-up appointment in 3 to 4 months for reassessment of cholesterol levels and lab work results.        Depression Screening and Follow-up Plan: Patient was screened for depression during today's encounter. They screened negative with a PHQ-2 score of 0.          History of Present Illness     History of Present Illness  The patient presents for an annual physical exam.    Weight Management  - He has been maintaining his weight without the need for strenuous exercise.    Mobility  - He reports no use of a walker or any other mobility aids.    Blood Pressure  - Hydrochlorothiazide was prescribed for blood pressure management, but he has not been taking it as his blood pressure readings have been within normal limits. Will need to monitor this.     Neck Pain  - Meloxicam was previously prescribed for neck pain, but he discontinued its use following successful chiropractic treatment.    Lower Extremities  - He reports no issues with his lower extremities, including thighs, knees, calves, legs, ankles, and feet.    Immunizations  - He is uncertain about the current status of his tetanus immunization but recalls receiving it around the birth of his first child.  - He received the influenza vaccine this year and subsequently contracted the flu for the first time in his life.    Allergies  - He is currently on a daily regimen of Allegra 180 mg and Flonase nasal spray for allergy management.    Supplemental information: He has never smoked.    SOCIAL HISTORY  He does not smoke.     Review of Systems   Constitutional:  Negative for activity change, appetite change, chills, diaphoresis, fatigue, fever and unexpected weight change.   HENT:  Negative for congestion, dental problem, drooling, ear discharge, ear pain, facial swelling, mouth  sores, nosebleeds, postnasal drip, rhinorrhea, trouble swallowing and voice change.    Eyes:  Negative for photophobia, pain, discharge, redness, itching and visual disturbance.   Respiratory:  Negative for apnea, cough, choking, chest tightness and shortness of breath.    Cardiovascular:  Negative for chest pain and leg swelling.   Gastrointestinal:  Negative for abdominal distention, abdominal pain, constipation, diarrhea and nausea.   Endocrine: Negative for polydipsia, polyphagia and polyuria.   Genitourinary:  Negative for decreased urine volume, difficulty urinating, dysuria, enuresis and hematuria.   Musculoskeletal:  Negative for arthralgias, back pain, gait problem and joint swelling.   Skin:  Negative for color change, pallor, rash and wound.   Allergic/Immunologic: Negative for immunocompromised state.   Neurological:  Negative for dizziness, seizures, syncope, facial asymmetry, speech difficulty, light-headedness and headaches.   Hematological:  Negative for adenopathy.   Psychiatric/Behavioral:  Negative for agitation, behavioral problems, confusion and decreased concentration.      Objective   /82 (BP Location: Left arm, Patient Position: Sitting, Cuff Size: Standard)   Pulse 80   Temp 98.7 °F (37.1 °C) (Temporal)   Ht 6' (1.829 m)   Wt 106 kg (234 lb)   SpO2 97%   BMI 31.74 kg/m²     Physical Exam  Respiratory: Clear to auscultation, no wheezing, rales or rhonchi  Gastrointestinal: Soft, no tenderness, no distention, no masses  Physical Exam  Vitals and nursing note reviewed.   Constitutional:       General: He is not in acute distress.     Appearance: Normal appearance. He is well-developed. He is not ill-appearing, toxic-appearing or diaphoretic.   HENT:      Head: Normocephalic and atraumatic.      Nose: Nose normal.      Mouth/Throat:      Mouth: Mucous membranes are moist.     Eyes:      Extraocular Movements: Extraocular movements intact.      Conjunctiva/sclera: Conjunctivae normal.       Pupils: Pupils are equal, round, and reactive to light.       Cardiovascular:      Rate and Rhythm: Normal rate and regular rhythm.      Pulses: Normal pulses.      Heart sounds: Normal heart sounds. No murmur heard.     No friction rub.   Pulmonary:      Effort: Pulmonary effort is normal. No respiratory distress.      Breath sounds: Normal breath sounds. No stridor. No wheezing or rhonchi.   Abdominal:      Palpations: Abdomen is soft.      Tenderness: There is no abdominal tenderness.     Musculoskeletal:         General: No swelling.      Cervical back: Neck supple.     Skin:     General: Skin is warm and dry.      Coloration: Skin is not jaundiced or pale.      Findings: No erythema or rash.     Neurological:      General: No focal deficit present.      Mental Status: He is alert and oriented to person, place, and time. Mental status is at baseline.     Psychiatric:         Mood and Affect: Mood normal.         Behavior: Behavior normal.         Thought Content: Thought content normal.         Judgment: Judgment normal.       Administrative Statements   I have spent a total time of 30 minutes in caring for this patient on the day of the visit/encounter including .